# Patient Record
Sex: MALE | Race: WHITE | Employment: OTHER | URBAN - METROPOLITAN AREA
[De-identification: names, ages, dates, MRNs, and addresses within clinical notes are randomized per-mention and may not be internally consistent; named-entity substitution may affect disease eponyms.]

---

## 2017-01-06 ENCOUNTER — HOSPITAL ENCOUNTER (OUTPATIENT)
Dept: CARDIOLOGY | Facility: CLINIC | Age: 52
Discharge: HOME | End: 2017-01-06
Attending: INTERNAL MEDICINE | Admitting: INTERNAL MEDICINE

## 2017-01-06 LAB
LV EF: 58 %
LVEF MODALITY: NORMAL

## 2017-01-09 ENCOUNTER — TELEPHONE (OUTPATIENT)
Dept: CARDIOLOGY CLINIC | Age: 52
End: 2017-01-09

## 2017-03-15 ENCOUNTER — OFFICE VISIT (OUTPATIENT)
Dept: OTHER | Age: 52
End: 2017-03-15

## 2017-03-15 ENCOUNTER — HOSPITAL ENCOUNTER (OUTPATIENT)
Dept: OTHER | Age: 52
Discharge: HOME | End: 2017-03-15
Attending: INTERNAL MEDICINE | Admitting: INTERNAL MEDICINE

## 2017-03-15 VITALS
SYSTOLIC BLOOD PRESSURE: 132 MMHG | HEIGHT: 71 IN | DIASTOLIC BLOOD PRESSURE: 76 MMHG | BODY MASS INDEX: 38.36 KG/M2 | WEIGHT: 274 LBS

## 2017-03-15 DIAGNOSIS — Z13.1 DM (DIABETES MELLITUS SCREEN): Primary | ICD-10-CM

## 2017-03-15 PROCEDURE — 99999 PR OFFICE/OUTPT VISIT,PROCEDURE ONLY: CPT | Performed by: HOSPITALIST

## 2017-03-15 RX ORDER — LISINOPRIL 5 MG/1
5 TABLET ORAL DAILY
Qty: 90 TABLET | Refills: 1 | Status: SHIPPED | OUTPATIENT
Start: 2017-03-15 | End: 2018-01-30 | Stop reason: SDUPTHER

## 2017-03-15 RX ORDER — NYSTATIN 100000 [USP'U]/G
POWDER TOPICAL
Qty: 1 BOTTLE | Refills: 5 | Status: SHIPPED | OUTPATIENT
Start: 2017-03-15 | End: 2017-06-14 | Stop reason: ALTCHOICE

## 2017-03-15 RX ORDER — CARVEDILOL 6.25 MG/1
TABLET ORAL
Qty: 180 TABLET | Refills: 1 | Status: SHIPPED | OUTPATIENT
Start: 2017-03-15 | End: 2018-01-30 | Stop reason: SDUPTHER

## 2017-03-15 RX ORDER — KETOCONAZOLE 20 MG/ML
SHAMPOO TOPICAL
Qty: 1 BOTTLE | Refills: 3 | Status: SHIPPED | OUTPATIENT
Start: 2017-03-15 | End: 2018-01-30 | Stop reason: ALTCHOICE

## 2017-06-14 ENCOUNTER — OFFICE VISIT (OUTPATIENT)
Dept: OTHER | Age: 52
End: 2017-06-14

## 2017-06-14 ENCOUNTER — HOSPITAL ENCOUNTER (OUTPATIENT)
Dept: OTHER | Age: 52
Discharge: HOME | End: 2017-06-14
Attending: INTERNAL MEDICINE | Admitting: INTERNAL MEDICINE

## 2017-06-14 VITALS
BODY MASS INDEX: 37.8 KG/M2 | SYSTOLIC BLOOD PRESSURE: 120 MMHG | HEIGHT: 71 IN | DIASTOLIC BLOOD PRESSURE: 80 MMHG | WEIGHT: 270 LBS

## 2017-06-14 DIAGNOSIS — E11.9 DIABETES MELLITUS WITHOUT COMPLICATION (HCC): Primary | ICD-10-CM

## 2017-06-14 PROCEDURE — 99999 PR OFFICE/OUTPT VISIT,PROCEDURE ONLY: CPT | Performed by: PODIATRIST

## 2018-01-18 RX ORDER — SIMVASTATIN 20 MG
TABLET ORAL
Qty: 90 TABLET | Refills: 0 | Status: SHIPPED | OUTPATIENT
Start: 2018-01-18 | End: 2018-01-30

## 2018-01-30 ENCOUNTER — HOSPITAL ENCOUNTER (OUTPATIENT)
Dept: OTHER | Age: 53
Discharge: HOME | End: 2018-01-30
Attending: INTERNAL MEDICINE | Admitting: INTERNAL MEDICINE

## 2018-01-30 ENCOUNTER — OFFICE VISIT (OUTPATIENT)
Dept: OTHER | Age: 53
End: 2018-01-30

## 2018-01-30 VITALS
OXYGEN SATURATION: 97 % | DIASTOLIC BLOOD PRESSURE: 80 MMHG | SYSTOLIC BLOOD PRESSURE: 124 MMHG | HEART RATE: 77 BPM | BODY MASS INDEX: 39.06 KG/M2 | WEIGHT: 279 LBS | HEIGHT: 71 IN

## 2018-01-30 DIAGNOSIS — I10 ESSENTIAL HYPERTENSION: ICD-10-CM

## 2018-01-30 DIAGNOSIS — E11.42 TYPE 2 DIABETES MELLITUS WITH DIABETIC POLYNEUROPATHY, WITHOUT LONG-TERM CURRENT USE OF INSULIN (HCC): Primary | ICD-10-CM

## 2018-01-30 DIAGNOSIS — R06.09 DYSPNEA ON EXERTION: ICD-10-CM

## 2018-01-30 DIAGNOSIS — I25.10 CORONARY ARTERY DISEASE INVOLVING NATIVE CORONARY ARTERY OF NATIVE HEART WITHOUT ANGINA PECTORIS: ICD-10-CM

## 2018-01-30 LAB — HBA1C MFR BLD: 8.6 %

## 2018-01-30 PROCEDURE — 99999 PR OFFICE/OUTPT VISIT,PROCEDURE ONLY: CPT | Performed by: INTERNAL MEDICINE

## 2018-01-30 PROCEDURE — 83036 HEMOGLOBIN GLYCOSYLATED A1C: CPT | Performed by: INTERNAL MEDICINE

## 2018-01-30 RX ORDER — LISINOPRIL 5 MG/1
5 TABLET ORAL DAILY
Qty: 30 TABLET | Refills: 5 | Status: SHIPPED | OUTPATIENT
Start: 2018-01-30 | End: 2018-08-27 | Stop reason: SDUPTHER

## 2018-01-30 RX ORDER — CARVEDILOL 6.25 MG/1
TABLET ORAL
Qty: 60 TABLET | Refills: 5 | Status: SHIPPED | OUTPATIENT
Start: 2018-01-30 | End: 2018-08-27 | Stop reason: SDUPTHER

## 2018-01-30 RX ORDER — ATORVASTATIN CALCIUM 20 MG/1
20 TABLET, FILM COATED ORAL DAILY
Qty: 30 TABLET | Refills: 5 | Status: SHIPPED | OUTPATIENT
Start: 2018-01-30 | End: 2018-08-27 | Stop reason: SDUPTHER

## 2018-02-01 RX ORDER — GABAPENTIN 100 MG/1
100 CAPSULE ORAL 2 TIMES DAILY
Qty: 60 CAPSULE | Refills: 5 | Status: SHIPPED | OUTPATIENT
Start: 2018-02-01 | End: 2019-11-27 | Stop reason: ALTCHOICE

## 2018-02-22 ENCOUNTER — OFFICE VISIT (OUTPATIENT)
Dept: CARDIOLOGY CLINIC | Age: 53
End: 2018-02-22

## 2018-02-22 ENCOUNTER — OFFICE VISIT (OUTPATIENT)
Dept: OTHER | Age: 53
End: 2018-02-22

## 2018-02-22 ENCOUNTER — HOSPITAL ENCOUNTER (OUTPATIENT)
Dept: OTHER | Age: 53
Discharge: HOME | End: 2018-02-22
Attending: INTERNAL MEDICINE | Admitting: INTERNAL MEDICINE

## 2018-02-22 VITALS
HEART RATE: 81 BPM | DIASTOLIC BLOOD PRESSURE: 80 MMHG | OXYGEN SATURATION: 96 % | SYSTOLIC BLOOD PRESSURE: 126 MMHG | HEIGHT: 71 IN | WEIGHT: 272 LBS | BODY MASS INDEX: 38.08 KG/M2

## 2018-02-22 VITALS
DIASTOLIC BLOOD PRESSURE: 70 MMHG | BODY MASS INDEX: 39.06 KG/M2 | HEART RATE: 74 BPM | SYSTOLIC BLOOD PRESSURE: 120 MMHG | WEIGHT: 279 LBS | HEIGHT: 71 IN | OXYGEN SATURATION: 97 %

## 2018-02-22 DIAGNOSIS — I25.10 CORONARY ARTERY DISEASE INVOLVING NATIVE CORONARY ARTERY OF NATIVE HEART WITHOUT ANGINA PECTORIS: ICD-10-CM

## 2018-02-22 DIAGNOSIS — R07.89 OTHER CHEST PAIN: ICD-10-CM

## 2018-02-22 DIAGNOSIS — I10 ESSENTIAL HYPERTENSION: ICD-10-CM

## 2018-02-22 DIAGNOSIS — E11.42 TYPE 2 DIABETES MELLITUS WITH DIABETIC POLYNEUROPATHY, WITHOUT LONG-TERM CURRENT USE OF INSULIN (HCC): Primary | ICD-10-CM

## 2018-02-22 DIAGNOSIS — R06.02 SOB (SHORTNESS OF BREATH): Primary | ICD-10-CM

## 2018-02-22 DIAGNOSIS — E78.00 HYPERCHOLESTEREMIA: ICD-10-CM

## 2018-02-22 DIAGNOSIS — R55 SYNCOPE, UNSPECIFIED SYNCOPE TYPE: ICD-10-CM

## 2018-02-22 DIAGNOSIS — E78.2 MIXED HYPERLIPIDEMIA: ICD-10-CM

## 2018-02-22 PROCEDURE — 99213 OFFICE O/P EST LOW 20 MIN: CPT | Performed by: INTERNAL MEDICINE

## 2018-02-22 PROCEDURE — 99999 PR OFFICE/OUTPT VISIT,PROCEDURE ONLY: CPT | Performed by: INTERNAL MEDICINE

## 2018-02-22 RX ORDER — NITROGLYCERIN 0.4 MG/1
0.4 TABLET SUBLINGUAL EVERY 5 MIN PRN
Qty: 25 TABLET | Refills: 3 | Status: SHIPPED | OUTPATIENT
Start: 2018-02-22 | End: 2019-05-10 | Stop reason: SDUPTHER

## 2018-02-22 RX ORDER — GLYBURIDE 5 MG/1
5 TABLET ORAL
Qty: 30 TABLET | Refills: 3 | Status: SHIPPED | OUTPATIENT
Start: 2018-02-22 | End: 2018-06-27 | Stop reason: SDUPTHER

## 2018-02-22 NOTE — LETTER
[address] [facility] - [address] [place] [address] Ale Cuellar [id number]  Phone: [phone]  Fax: [phone]    Sumit Modi MD        February 22, 2018     Elin Chester MD  [address] [facility] [address]    Patient: Ricardo Rivero  MR Number: [id number]  YOB: 1965  Date of Visit: 2/22/2018    Dear Dr. Elin Chester:    Thank you for allowing me to participate in the care of Ricardo Rivero. Below are the relevant portions of my assessment and plan of care. Assessment and Plan   Ricardo Rivero is a 47 y.o. male who presents today for the following problems:       1. CAD:  cath with nonobstructive CAD. 50% ostial LCx. No angina              -felt that  Hypertensive response to exercise as cause of CP              ~ Cont ASA, zocor, lisinopril, coreg     2. 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 Syncope- sounds vasovagal in nature. - No further events     4. HLD- pt off meds, stress importance     5. HTN- controlled.     6. Epistaxis: still number of episodes on ASA 81mg,               - ENT note, pt with superficial vessel and just needs to humidfy nostrils. stable     7. SOB: likely Anxiety attacks and rise in BP              - echo and stress test normal (except for HTN response)     Plan:  1 stress reduction techniques, trial for NTG for symptoms              - follow home BP  2. Lipids in 3-6mon with PCP  Follow up in 1 year        If you have questions, please do not hesitate to call me. I look forward to following Barbara Brewer along with you.     Sincerely,        Sumit Modi MD

## 2018-02-22 NOTE — PROGRESS NOTES
(diabetes mellitus) ([facility] [address] Hyperlipidemia; and Hypertension. Surgical History:   has a past surgical history that includes Coronary angioplasty (10/20/14) and cardiovascular stress test (11/2013). Social History:   reports that he has never smoked. He has never used smokeless tobacco. He reports that he drinks alcohol. He reports that he does not use drugs. Family History:  No evidence for sudden cardiac death or premature CAD    Home Medications:  Reviewed and are listed in nursing record. and/or listed below  Current Outpatient Prescriptions   Medication Sig Dispense Refill    gabapentin (NEURONTIN) 100 MG capsule Take 1 capsule by mouth 2 times daily for 360 doses. 60 capsule 5    atorvastatin (LIPITOR) 20 MG tablet Take 1 tablet by mouth daily 30 tablet 5    metFORMIN (GLUCOPHAGE) 1000 MG tablet Take 1 tablet by mouth 2 times daily (with meals) 60 tablet 5    carvedilol (COREG) 6.25 MG tablet TAKE ONE TABLET BY MOUTH TWICE A DAY WITH MEALS 60 tablet 5    lisinopril (PRINIVIL;ZESTRIL) 5 MG tablet Take 1 tablet by mouth daily 30 tablet 5    aspirin 81 MG chewable tablet Take 1 tablet by mouth daily. 30 tablet 0    glyBURIDE (DIABETA) 5 MG tablet Take 1 tablet by mouth daily (with breakfast) 30 tablet 3    nitroGLYCERIN (NITROSTAT) 0.4 MG SL tablet Place 1 tablet under the tongue every 5 minutes as needed for Chest pain. 25 tablet 3     No current facility-administered medications for this visit. Allergies:  Review of patient's allergies indicates no known allergies. Review of Systems:   All 12 point review of symptoms completed. Pertinent positives identified in the HPI, all other review of symptoms negative as below.     Objective:   PHYSICAL EXAM:    Vitals:    02/22/18 1158   BP: 126/80   Pulse: 81   SpO2: 96%    Weight: 272 lb (123.4 kg)     Wt Readings from Last 3 Encounters:   02/22/18 272 lb (123.4 kg)   02/22/18 279 lb (126.6 kg)   01/30/18 279 lb (126.6 kg)         General

## 2018-02-22 NOTE — PROGRESS NOTES
lymphadenopathy,   LUNGS:  Clear and without wheezes  HEART:  Regular rhythm, no appreciable murmur  ABD:  Benign, active bowel sounds  EXT:  No edema, pulses palpable  NEURO:  No focal neurologic deficits noted. ASSESSMENT / PLAN:   1. Type 2 Diabetes: The hemoglobin A1c was increased to 8.6% last visit (this was last checked December 2016 at 8.1, goal less than 7.0). Continue taking metformin 1000 mg twice per day. Start taking glyburide 5 mg ONCE per day in the morning. 2. Hypertension:  Blood pressure excellent today at 120/70, goal less than 130/90. Continue taking lisinopril 5 mg once per day and carvedilol (Coreg) 6.25 mg twice per day. 3. History of heart disease:  Continue taking aspirin 81 mg daily. 4. Cholesterol:  Continue taking atorvastatin (Lipitor) 20 mg every evening.      Follow-up with cardiology and clinic three months

## 2018-05-21 ENCOUNTER — OFFICE VISIT (OUTPATIENT)
Dept: OTHER | Age: 53
End: 2018-05-21

## 2018-05-21 ENCOUNTER — HOSPITAL ENCOUNTER (OUTPATIENT)
Dept: GENERAL RADIOLOGY | Age: 53
Discharge: HOME | End: 2018-05-21
Attending: INTERNAL MEDICINE | Admitting: INTERNAL MEDICINE

## 2018-05-21 ENCOUNTER — HOSPITAL ENCOUNTER (OUTPATIENT)
Dept: OTHER | Age: 53
Discharge: HOME | End: 2018-05-21
Attending: INTERNAL MEDICINE | Admitting: INTERNAL MEDICINE

## 2018-05-21 VITALS
OXYGEN SATURATION: 95 % | DIASTOLIC BLOOD PRESSURE: 78 MMHG | HEIGHT: 72 IN | HEART RATE: 73 BPM | SYSTOLIC BLOOD PRESSURE: 138 MMHG | BODY MASS INDEX: 37.38 KG/M2 | WEIGHT: 276 LBS

## 2018-05-21 DIAGNOSIS — R53.83 FATIGUE, UNSPECIFIED TYPE: ICD-10-CM

## 2018-05-21 DIAGNOSIS — E78.00 HYPERCHOLESTEREMIA: ICD-10-CM

## 2018-05-21 DIAGNOSIS — I10 ESSENTIAL HYPERTENSION: ICD-10-CM

## 2018-05-21 DIAGNOSIS — I25.10 CORONARY ARTERY DISEASE INVOLVING NATIVE CORONARY ARTERY OF NATIVE HEART WITHOUT ANGINA PECTORIS: ICD-10-CM

## 2018-05-21 LAB
A/G RATIO: 1.5 (ref 1.1–2.2)
ALBUMIN SERPL-MCNC: 4.5 G/DL (ref 3.4–5)
ALP BLD-CCNC: 53 U/L (ref 40–129)
ALT SERPL-CCNC: 33 U/L (ref 10–40)
ANION GAP SERPL CALCULATED.3IONS-SCNC: 18 MMOL/L (ref 3–16)
AST SERPL-CCNC: 22 U/L (ref 15–37)
BASOPHILS ABSOLUTE: 0.1 K/UL (ref 0–0.2)
BASOPHILS RELATIVE PERCENT: 1 %
BILIRUB SERPL-MCNC: 0.5 MG/DL (ref 0–1)
BUN BLDV-MCNC: 12 MG/DL (ref 7–20)
CALCIUM SERPL-MCNC: 9.6 MG/DL (ref 8.3–10.6)
CHLORIDE BLD-SCNC: 101 MMOL/L (ref 99–110)
CO2: 24 MMOL/L (ref 21–32)
CREAT SERPL-MCNC: 0.7 MG/DL (ref 0.9–1.3)
EOSINOPHILS ABSOLUTE: 0.3 K/UL (ref 0–0.6)
EOSINOPHILS RELATIVE PERCENT: 3.1 %
GFR AFRICAN AMERICAN: >60
GFR NON-AFRICAN AMERICAN: >60
GLOBULIN: 3 G/DL
GLUCOSE BLD-MCNC: 147 MG/DL (ref 70–99)
HCT VFR BLD CALC: 46.9 % (ref 40.5–52.5)
HEMOGLOBIN: 16.4 G/DL (ref 13.5–17.5)
LYMPHOCYTES ABSOLUTE: 2.4 K/UL (ref 1–5.1)
LYMPHOCYTES RELATIVE PERCENT: 28 %
MCH RBC QN AUTO: 28.8 PG (ref 26–34)
MCHC RBC AUTO-ENTMCNC: 34.9 G/DL (ref 31–36)
MCV RBC AUTO: 82.5 FL (ref 80–100)
MONOCYTES ABSOLUTE: 0.8 K/UL (ref 0–1.3)
MONOCYTES RELATIVE PERCENT: 9.2 %
NEUTROPHILS ABSOLUTE: 5 K/UL (ref 1.7–7.7)
NEUTROPHILS RELATIVE PERCENT: 58.7 %
PDW BLD-RTO: 14.3 % (ref 12.4–15.4)
PLATELET # BLD: 209 K/UL (ref 135–450)
PMV BLD AUTO: 9.5 FL (ref 5–10.5)
POTASSIUM SERPL-SCNC: 4.4 MMOL/L (ref 3.5–5.1)
RBC # BLD: 5.68 M/UL (ref 4.2–5.9)
SODIUM BLD-SCNC: 143 MMOL/L (ref 136–145)
TOTAL PROTEIN: 7.5 G/DL (ref 6.4–8.2)
TSH REFLEX FT4: 1.62 UIU/ML (ref 0.27–4.2)
VITAMIN B-12: 600 PG/ML (ref 211–911)
WBC # BLD: 8.4 K/UL (ref 4–11)

## 2018-05-21 PROCEDURE — 83036 HEMOGLOBIN GLYCOSYLATED A1C: CPT | Performed by: INTERNAL MEDICINE

## 2018-05-21 PROCEDURE — 99999 PR OFFICE/OUTPT VISIT,PROCEDURE ONLY: CPT | Performed by: INTERNAL MEDICINE

## 2018-05-21 RX ORDER — CITALOPRAM 20 MG/1
20 TABLET ORAL DAILY
Qty: 30 TABLET | Refills: 3 | Status: SHIPPED | OUTPATIENT
Start: 2018-05-21 | End: 2018-08-27 | Stop reason: SINTOL

## 2018-05-22 ENCOUNTER — TELEPHONE (OUTPATIENT)
Dept: OTHER | Age: 53
End: 2018-05-22

## 2018-05-23 LAB — HBA1C MFR BLD: 7 %

## 2018-06-28 RX ORDER — GLYBURIDE 5 MG/1
TABLET ORAL
Qty: 30 TABLET | Refills: 0 | Status: SHIPPED | OUTPATIENT
Start: 2018-06-28 | End: 2018-08-06 | Stop reason: SDUPTHER

## 2018-08-06 RX ORDER — GLYBURIDE 5 MG/1
TABLET ORAL
Qty: 30 TABLET | Refills: 3 | Status: SHIPPED | OUTPATIENT
Start: 2018-08-06 | End: 2019-01-15 | Stop reason: SDUPTHER

## 2018-08-27 ENCOUNTER — OFFICE VISIT (OUTPATIENT)
Dept: OTHER | Age: 53
End: 2018-08-27

## 2018-08-27 VITALS
BODY MASS INDEX: 37.38 KG/M2 | DIASTOLIC BLOOD PRESSURE: 81 MMHG | HEART RATE: 72 BPM | HEIGHT: 72 IN | SYSTOLIC BLOOD PRESSURE: 120 MMHG | WEIGHT: 276 LBS | OXYGEN SATURATION: 96 %

## 2018-08-27 DIAGNOSIS — G47.33 OBSTRUCTIVE SLEEP APNEA SYNDROME: ICD-10-CM

## 2018-08-27 DIAGNOSIS — I10 ESSENTIAL HYPERTENSION: ICD-10-CM

## 2018-08-27 LAB — HBA1C MFR BLD: 6.7 %

## 2018-08-27 PROCEDURE — 83036 HEMOGLOBIN GLYCOSYLATED A1C: CPT | Performed by: INTERNAL MEDICINE

## 2018-08-27 PROCEDURE — 99214 OFFICE O/P EST MOD 30 MIN: CPT | Performed by: INTERNAL MEDICINE

## 2018-08-27 RX ORDER — LISINOPRIL 5 MG/1
5 TABLET ORAL DAILY
Qty: 30 TABLET | Refills: 5 | Status: SHIPPED | OUTPATIENT
Start: 2018-08-27 | End: 2019-05-06 | Stop reason: SDUPTHER

## 2018-08-27 RX ORDER — CARVEDILOL 6.25 MG/1
TABLET ORAL
Qty: 60 TABLET | Refills: 5 | Status: SHIPPED | OUTPATIENT
Start: 2018-08-27 | End: 2019-05-06 | Stop reason: SDUPTHER

## 2018-08-27 RX ORDER — ATORVASTATIN CALCIUM 20 MG/1
20 TABLET, FILM COATED ORAL DAILY
Qty: 30 TABLET | Refills: 5 | Status: SHIPPED | OUTPATIENT
Start: 2018-08-27 | End: 2019-05-06 | Stop reason: SDUPTHER

## 2018-08-27 NOTE — PROGRESS NOTES
thickness and systolic function with an estimated ejection fraction of 55-60%. No regional wall motion abnormalities are seen.  Grade I diastolic dysfunction with normal filing pressure. Cath (Oct 2014) nonobstructive CAD. 50% ostial LCx       OBJECTIVE:    /81   Pulse 72   Ht 6' (1.829 m)   Wt 276 lb (125.2 kg)   SpO2 96%   BMI 37.43 kg/m²   HEENT:  Oropharynx clear, no lymphadenopathy,   LUNGS:  Clear and without wheezes  HEART:  Regular rhythm, no appreciable murmur  ABD:  Benign, active bowel sounds  EXT:  No edema, pulses palpable  NEURO:  No focal neurologic deficits noted. ASSESSMENT / PLAN:   1. Type 2 Diabetes:  The hemoglobin A1c is good today at 6.7% (goal less than 7.0). Continue taking metformin 1000 mg twice per day AND glyburide 5 mg ONCE per day in the morning.    2. Hypertension:  Blood pressure is reasonable today at 120/81, goal less than 130/90.  Continue taking lisinopril 5 mg once per day and carvedilol (Coreg) 6.25 mg twice per day.    3. History of heart disease:  Continue taking aspirin 81 mg daily. 4. Cholesterol:  Continue taking atorvastatin (Lipitor) 20 mg every evening. 5. Anxiety from serotonin depletion:  Lab tests for blood count, B12, TSH (thyroid level), and electrolytes are normal.    6. Daytime post-prandial (after you eat) somnolence (sleepiness): We will refer for sleep evaluation to pulmonary. Check glucose level to make sure that you are not hypoglycemic.   Carry some hard candy with you for potential low blood sugar levels.       Follow-up in four weeks  Check morning (fasting) blood glucose most days, write down and bring to clinic

## 2018-08-27 NOTE — PATIENT INSTRUCTIONS
1. Type 2 Diabetes:  The hemoglobin A1c is good today at 6.7% (goal less than 7.0). Continue taking metformin 1000 mg twice per day AND glyburide 5 mg ONCE per day in the morning.    2. Hypertension:  Blood pressure is reasonable today at 120/81, goal less than 130/90.  Continue taking lisinopril 5 mg once per day and carvedilol (Coreg) 6.25 mg twice per day.    3. History of heart disease:  Continue taking aspirin 81 mg daily. 4. Cholesterol:  Continue taking atorvastatin (Lipitor) 20 mg every evening. 5. Anxiety from serotonin depletion:  Lab tests for blood count, B12, TSH (thyroid level), and electrolytes are normal.    6. Daytime post-prandial (after you eat) somnolence (sleepiness): We will refer for sleep evaluation to pulmonary. Check glucose level to make sure that you are not hypoglycemic.   Carry some hard candy with you for potential low blood sugar levels.       Follow-up in four weeks  Check morning (fasting) blood glucose most days, write down and bring to clinic

## 2018-09-24 ENCOUNTER — OFFICE VISIT (OUTPATIENT)
Dept: INTERNAL MEDICINE CLINIC | Age: 53
End: 2018-09-24

## 2018-09-24 VITALS
BODY MASS INDEX: 37.38 KG/M2 | HEART RATE: 72 BPM | DIASTOLIC BLOOD PRESSURE: 74 MMHG | OXYGEN SATURATION: 97 % | WEIGHT: 276 LBS | SYSTOLIC BLOOD PRESSURE: 120 MMHG | HEIGHT: 72 IN

## 2018-09-24 DIAGNOSIS — I10 ESSENTIAL HYPERTENSION: ICD-10-CM

## 2018-09-24 DIAGNOSIS — E78.00 HYPERCHOLESTEREMIA: ICD-10-CM

## 2018-09-24 DIAGNOSIS — G47.33 OBSTRUCTIVE SLEEP APNEA SYNDROME: ICD-10-CM

## 2018-09-24 NOTE — PROGRESS NOTES
SUBJECTIVE:   One month follow up from 8/27 for diabetes, morning glucose levels 170-180. Sleep is reasonable but up to urinate 1-2 times per night. Body aches at night. Follow up from 5/21 for anxious depression in addition to diabetes and hypertension. He stopped taking Celexa because it made him feel like he had a hangover. Sleep was unchanged. He sleeps about 4-6 hours per night. Morning glucose levels not checked. He notes sudden daytime somnolence that is triggered by eating.       History of Present Illness:   History of diabetes and hypertension. Taking metformin 1000 mg twice daily and now on glyburide 5 mg in the morning.  Morning glucose mostly between 170-220.  No hypoglycemic episodes.  He is able to sleep without orthopnea, some dyspnea with exertion.  Only sleeping approx 5 hours nightly. He now goes to sleep quickly around 10 pm with some early awakening.  Usually wakes up at 5:30 am.  Some anxiety during the day with palpitations and frustration.  Diabetic neuropathy is reasonable and taking gabapentin only as needed. MEDICATIONS:  atorvastatin (LIPITOR) 20 MG tablet Take 1 tablet by mouth daily   metFORMIN (GLUCOPHAGE) 1000 MG tablet Take 1 tablet by mouth 2 times daily (with meals)   carvedilol (COREG) 6.25 MG tablet TAKE ONE TABLET BY MOUTH TWICE A DAY WITH MEALS   lisinopril (PRINIVIL;ZESTRIL) 5 MG tablet Take 1 tablet by mouth daily   glyBURIDE (DIABETA) 5 MG tablet TAKE ONE TABLET BY MOUTH DAILY WITH BREAKFAST   nitroGLYCERIN (NITROSTAT) 0.4 MG SL tablet Place 1 tablet under the tongue every 5 minutes as needed for Chest pain   aspirin 81 MG chewable tablet Take 1 tablet by mouth daily. Hemoglobin A1c (1/30/18) 8.6%, (5/21/18) 7.0%, (8/27/18) 6.7%     Echocardiogram:  (1/6/17) Normal left ventricle size, wall thickness and systolic function with an estimated ejection fraction of 55-60%.  No regional wall motion abnormalities are seen.  Grade I diastolic dysfunction with normal filing pressure.     Cath (Oct 2014) nonobstructive CAD. 50% ostial LCx       OBJECTIVE:    /74   Pulse 72   Ht 6' (1.829 m)   Wt 276 lb (125.2 kg)   SpO2 97%   BMI 37.43 kg/m²   HEENT:  Oropharynx clear, no lymphadenopathy,   LUNGS:  Clear and without wheezes  HEART:  Regular rhythm, no appreciable murmur  ABD:  Benign, active bowel sounds  EXT:  No edema, pulses palpable  NEURO:  No focal neurologic deficits noted. ASSESSMENT / PLAN:   1. Type 2 Diabetes:  The hemoglobin A1c is last month is good at 6.7% (goal less than 7.0).  Continue taking metformin 1000 mg twice per day AND glyburide 5 mg ONCE per day in the morning.    2. Hypertension:  Blood pressure is reasonable today at 120/74, goal less than 130/90.  Continue taking lisinopril 5 mg once per day and carvedilol (Coreg) 6.25 mg twice per day.    3. History of heart disease:  Continue taking aspirin 81 mg daily. 4. Cholesterol:  Continue taking atorvastatin (Lipitor) 20 mg every evening. 5. Anxiety from serotonin depletion:  Lab tests for blood count, B12, TSH (thyroid level), and electrolytes are normal.    6. Daytime post-prandial (after you eat) somnolence (sleepiness):  Sleep evaluation by pulmonary pending. Check glucose level to make sure that you are not hypoglycemic. Carry some hard candy with you for potential low blood sugar levels. 7. Osteoarthritis:  You can take Aleve (naproxen) 220 mg 1-2 times per day. Take an over the counter famotidine (Pepcid) 20 mg tablet when you Aleve.       Follow-up 3 months

## 2018-09-24 NOTE — PATIENT INSTRUCTIONS
1. Type 2 Diabetes:  The hemoglobin A1c is last month is good at 6.7% (goal less than 7.0).  Continue taking metformin 1000 mg twice per day AND glyburide 5 mg ONCE per day in the morning.    2. Hypertension:  Blood pressure is reasonable today at 120/74, goal less than 130/90.  Continue taking lisinopril 5 mg once per day and carvedilol (Coreg) 6.25 mg twice per day.    3. History of heart disease:  Continue taking aspirin 81 mg daily. 4. Cholesterol:  Continue taking atorvastatin (Lipitor) 20 mg every evening. 5. Anxiety from serotonin depletion:  Lab tests for blood count, B12, TSH (thyroid level), and electrolytes are normal.    6. Daytime post-prandial (after you eat) somnolence (sleepiness):  Sleep evaluation by pulmonary pending. Check glucose level to make sure that you are not hypoglycemic. Carry some hard candy with you for potential low blood sugar levels. 7. Osteoarthritis:  You can take Aleve (naproxen) 220 mg 1-2 times per day. Take an over the counter famotidine (Pepcid) 20 mg tablet when you Aleve.       Follow-up 3 months

## 2018-10-26 ENCOUNTER — TELEPHONE (OUTPATIENT)
Dept: PULMONOLOGY | Age: 53
End: 2018-10-26

## 2019-01-17 RX ORDER — GLYBURIDE 5 MG/1
TABLET ORAL
Qty: 30 TABLET | Refills: 2 | Status: SHIPPED | OUTPATIENT
Start: 2019-01-17 | End: 2019-05-06 | Stop reason: SDUPTHER

## 2019-05-06 RX ORDER — CARVEDILOL 6.25 MG/1
TABLET ORAL
Qty: 180 TABLET | Refills: 1 | OUTPATIENT
Start: 2019-05-06 | End: 2019-11-20 | Stop reason: SDUPTHER

## 2019-05-06 RX ORDER — GLYBURIDE 5 MG/1
TABLET ORAL
Qty: 90 TABLET | Refills: 1 | OUTPATIENT
Start: 2019-05-06 | End: 2019-11-20 | Stop reason: SDUPTHER

## 2019-05-06 RX ORDER — LISINOPRIL 5 MG/1
5 TABLET ORAL DAILY
Qty: 90 TABLET | Refills: 1 | OUTPATIENT
Start: 2019-05-06 | End: 2019-11-20 | Stop reason: SDUPTHER

## 2019-05-06 RX ORDER — ATORVASTATIN CALCIUM 20 MG/1
20 TABLET, FILM COATED ORAL DAILY
Qty: 90 TABLET | Refills: 1 | OUTPATIENT
Start: 2019-05-06 | End: 2019-11-20 | Stop reason: SDUPTHER

## 2019-05-10 DIAGNOSIS — R07.89 OTHER CHEST PAIN: ICD-10-CM

## 2019-05-13 RX ORDER — NITROGLYCERIN 0.4 MG/1
TABLET SUBLINGUAL
Qty: 25 TABLET | Refills: 0 | Status: SHIPPED | OUTPATIENT
Start: 2019-05-13 | End: 2019-08-18 | Stop reason: SDUPTHER

## 2019-05-13 NOTE — TELEPHONE ENCOUNTER
SHADI Pt  Last office visit 2/22/18  Plan:  1 stress reduction techniques, trial for NTG for symptoms              - follow home BP  2. Lipids in 3-6mon with PCP  Follow up in 1 year     I called and LMOM for pt to contact the office for a yearly appt.

## 2019-08-18 DIAGNOSIS — R07.89 OTHER CHEST PAIN: ICD-10-CM

## 2019-08-19 ENCOUNTER — TELEPHONE (OUTPATIENT)
Dept: CARDIOLOGY CLINIC | Age: 54
End: 2019-08-19

## 2019-08-19 RX ORDER — NITROGLYCERIN 0.4 MG/1
TABLET SUBLINGUAL
Qty: 25 TABLET | Refills: 0 | Status: SHIPPED | OUTPATIENT
Start: 2019-08-19 | End: 2020-03-11 | Stop reason: SDUPTHER

## 2019-11-21 RX ORDER — ATORVASTATIN CALCIUM 20 MG/1
TABLET, FILM COATED ORAL
Qty: 90 TABLET | Refills: 1 | Status: SHIPPED | OUTPATIENT
Start: 2019-11-21 | End: 2020-06-11 | Stop reason: SDUPTHER

## 2019-11-21 RX ORDER — GLYBURIDE 5 MG/1
TABLET ORAL
Qty: 90 TABLET | Refills: 1 | Status: SHIPPED | OUTPATIENT
Start: 2019-11-21 | End: 2020-06-11 | Stop reason: SDUPTHER

## 2019-11-21 RX ORDER — LISINOPRIL 5 MG/1
TABLET ORAL
Qty: 90 TABLET | Refills: 1 | Status: SHIPPED | OUTPATIENT
Start: 2019-11-21 | End: 2020-06-11 | Stop reason: SDUPTHER

## 2019-11-21 RX ORDER — CARVEDILOL 6.25 MG/1
TABLET ORAL
Qty: 180 TABLET | Refills: 1 | Status: SHIPPED | OUTPATIENT
Start: 2019-11-21 | End: 2020-06-11 | Stop reason: SDUPTHER

## 2019-11-27 ENCOUNTER — OFFICE VISIT (OUTPATIENT)
Dept: INTERNAL MEDICINE CLINIC | Age: 54
End: 2019-11-27

## 2019-11-27 VITALS
HEART RATE: 80 BPM | BODY MASS INDEX: 36.84 KG/M2 | HEIGHT: 72 IN | OXYGEN SATURATION: 97 % | DIASTOLIC BLOOD PRESSURE: 76 MMHG | SYSTOLIC BLOOD PRESSURE: 124 MMHG | WEIGHT: 272 LBS

## 2019-11-27 DIAGNOSIS — L82.1 SEBORRHEIC KERATOSES: ICD-10-CM

## 2019-11-27 DIAGNOSIS — J30.1 NON-SEASONAL ALLERGIC RHINITIS DUE TO POLLEN: ICD-10-CM

## 2019-11-27 DIAGNOSIS — I25.10 CORONARY ARTERY DISEASE DUE TO LIPID RICH PLAQUE: ICD-10-CM

## 2019-11-27 DIAGNOSIS — I25.83 CORONARY ARTERY DISEASE DUE TO LIPID RICH PLAQUE: ICD-10-CM

## 2019-11-27 DIAGNOSIS — F51.01 PRIMARY INSOMNIA: ICD-10-CM

## 2019-11-27 DIAGNOSIS — D18.01 HEMANGIOMA OF SKIN: ICD-10-CM

## 2019-11-27 DIAGNOSIS — I10 ESSENTIAL HYPERTENSION: ICD-10-CM

## 2019-11-27 LAB — HBA1C MFR BLD: 6.8 %

## 2019-11-27 PROCEDURE — 83036 HEMOGLOBIN GLYCOSYLATED A1C: CPT | Performed by: INTERNAL MEDICINE

## 2019-11-27 PROCEDURE — 99214 OFFICE O/P EST MOD 30 MIN: CPT | Performed by: INTERNAL MEDICINE

## 2019-11-27 PROCEDURE — 90471 IMMUNIZATION ADMIN: CPT | Performed by: INTERNAL MEDICINE

## 2019-11-27 PROCEDURE — 90686 IIV4 VACC NO PRSV 0.5 ML IM: CPT | Performed by: INTERNAL MEDICINE

## 2019-11-27 RX ORDER — GABAPENTIN 100 MG/1
100 CAPSULE ORAL 2 TIMES DAILY PRN
Qty: 60 CAPSULE | Refills: 2 | Status: SHIPPED | OUTPATIENT
Start: 2019-11-27 | End: 2022-06-23 | Stop reason: ALTCHOICE

## 2019-11-27 RX ORDER — GABAPENTIN 100 MG/1
100 CAPSULE ORAL 2 TIMES DAILY
Qty: 60 CAPSULE | Refills: 5 | Status: CANCELLED | OUTPATIENT
Start: 2019-11-27 | End: 2020-05-25

## 2020-01-08 NOTE — PROGRESS NOTES
[address] [facility]   Cardiovascular Evaluation    PATIENT: Ave Jimenez  DATE: 2020  MRN: <[id number]>  CSN: [id number]  : 1965      Primary Care Doctor: Virginia He MD  Reason for evaluation:   Follow-up and Shortness of Breath      Subjective:   History of present illness on initial date of evaluation:   Ave Jimenez is a 49 y.o. patient who presents in f/u from a ER visit 14   He has been without known prior cardiac history who has not seen a physician for many years. He came to the emergency room at the urging of his fiancee because of chest pain and tingling in his hands. He was primarily concerned about the tingling of his hands, but we were asked to see him to evaluate the chest pain and abnormal stress test. He describes chest pain that feels like a pressure sensation in the center of his chest, and it is not necessarily exertional, and in fact sometimes it gets worse when he moves his left shoulder. He thought it was related to a rotator cuff injury. He is very nonspecific in the description of the pain. It does not radiate. He has no associated shortness of breath. He has no vomiting or diaphoresis. He is currently pain-free. He is diabetic  He also has had elevated blood pressures. He had a CTA 14 Ca score 221  11/15/13 he had a stress myoview showed some ischemia. Today he reports feeling ok. He states he has noticed he gets out of breath quicker. He states a week before Asmita he states he felt like he couldn't breath, but he was breathing ok. He states he did feel tightness in his chest at that time as well. He states it was not pain. He states he thought about going to the hospital. He states he thought maybe it was anxiety. He states he does go up and down a ladder due to his job and does ok for the most part. He denies dizziness or syncope.      Patient Active Problem List   Diagnosis    Chest pain    DM (diabetes mellitus), secondary uncontrolled ([facility] [address]    Polycythemia    SOB (shortness of breath)    HTN (hypertension)    CAD (coronary artery disease)    Deviated septum         Past Medical History:   has a past medical history of CAD (coronary artery disease), DM (diabetes mellitus) ([address] Hyperlipidemia, Hypertension, and MRSA (methicillin resistant staph aureus) culture positive. Surgical History:   has a past surgical history that includes Coronary angioplasty (10/20/14) and cardiovascular stress test (11/2013). Social History:   reports that he has never smoked. He has never used smokeless tobacco. He reports current alcohol use. He reports that he does not use drugs. Family History:  No evidence for sudden cardiac death or premature CAD    Home Medications:  Reviewed and are listed in nursing record. and/or listed below  Current Outpatient Medications   Medication Sig Dispense Refill    lisinopril (PRINIVIL;ZESTRIL) 5 MG tablet TAKE ONE TABLET BY MOUTH DAILY 90 tablet 1    atorvastatin (LIPITOR) 20 MG tablet TAKE ONE TABLET BY MOUTH DAILY 90 tablet 1    metFORMIN (GLUCOPHAGE) 1000 MG tablet TAKE ONE TABLET BY MOUTH TWICE A DAY WITH MEALS 180 tablet 1    glyBURIDE (DIABETA) 5 MG tablet TAKE ONE TABLET BY MOUTH DAILY WITH BREAKFAST 90 tablet 1    carvedilol (COREG) 6.25 MG tablet TAKE ONE TABLET BY MOUTH TWICE A DAY WITH MEALS 180 tablet 1    nitroGLYCERIN (NITROSTAT) 0.4 MG SL tablet DISSOLVE 1 TAB UNDER TONGUE FOR CHEST PAIN - IF PAIN REMAINS AFTER 5 MIN, CALL 911 AND REPEAT DOSE. MAX 3 TABS IN 15 MINUTES 25 tablet 0    aspirin 81 MG chewable tablet Take 1 tablet by mouth daily. 30 tablet 0    gabapentin (NEURONTIN) 100 MG capsule Take 1 capsule by mouth 2 times daily as needed (neuropathic pain) for up to 360 days. (Patient not taking: Reported on 1/9/2020) 60 capsule 2     No current facility-administered medications for this visit. Allergies:  Patient has no known allergies.      Review of Systems:   All 12 point review of symptoms completed. Pertinent positives identified in the HPI, all other review of symptoms negative as below.     Objective:   PHYSICAL EXAM:    Vitals:    01/09/20 0809   BP: 130/86   Pulse: 72   SpO2: 98%    Weight: 273 lb (123.8 kg)     Wt Readings from Last 3 Encounters:   01/09/20 273 lb (123.8 kg)   11/27/19 272 lb (123.4 kg)   09/24/18 276 lb (125.2 kg)         General Appearance:  Alert, cooperative, no distress, appears stated age   Head:  Normocephalic, atraumatic   Eyes:  PERRL, conjunctiva/corneas clear   Nose: Nares normal, no drainage or sinus tenderness   Throat: Lips, mucosa, and tongue normal   Neck: Supple, symmetrical, trachea midline, NL thyroid no carotid bruit or JVD   Lungs:   CTAB, respirations unlabored   Chest Wall:  No tenderness or deformity   Heart:  Regular rhythm and normal rate; S1, S2 are normal;   no murmur noted; no rub or gallop   Abdomen:   Soft, non-tender, +BS x 4, no masses, no organomegaly   Extremities: Extremities normal, atraumatic, no cyanosis or edema   Pulses: 2+ and symmetric   Skin: Skin color, texture, turgor normal, no rashes or lesions   Pysch: Normal mood and affect   Neurologic: Normal gross motor and sensory exam.         LABS   CBC:      Lab Results   Component Value Date    WBC 8.4 05/21/2018    RBC 5.68 05/21/2018    HGB 16.4 05/21/2018    HCT 46.9 05/21/2018    MCV 82.5 05/21/2018    RDW 14.3 05/21/2018     05/21/2018     CMP:  Lab Results   Component Value Date     05/21/2018    K 4.4 05/21/2018     05/21/2018    CO2 24 05/21/2018    BUN 12 05/21/2018    CREATININE 0.7 05/21/2018    GFRAA >60 05/21/2018    AGRATIO 1.5 05/21/2018    LABGLOM >60 05/21/2018    GLUCOSE 147 05/21/2018    PROT 7.5 05/21/2018    CALCIUM 9.6 05/21/2018    BILITOT 0.5 05/21/2018    ALKPHOS 53 05/21/2018    AST 22 05/21/2018    ALT 33 05/21/2018     PT/INR:   No components found for: PTPATIENT,  PTINR  Liver:  No components found for: CHLPL  Lab Results   Component Value Date    ALT 33 05/21/2018    AST 22 05/21/2018    ALKPHOS 53 05/21/2018    BILITOT 0.5 05/21/2018     Lab Results   Component Value Date    LABA1C 6.8 11/27/2019     Lipids:         Lab Results   Component Value Date    TRIG 131 06/26/2015    TRIG 74 10/20/2014    TRIG 130 04/21/2014            Lab Results   Component Value Date    HDL 34 (L) 06/26/2015    HDL 46 10/20/2014    HDL 46 04/21/2014            Lab Results   Component Value Date    LDLCALC 72 06/26/2015    LDLCALC 93 10/20/2014    LDLCALC 110 (H) 04/21/2014            Lab Results   Component Value Date    LABVLDL 26 06/26/2015    LABVLDL 15 10/20/2014    LABVLDL 26 04/21/2014         CARDIAC DATA   EKG:   10/16/2014- NSR, RSR pattern, LAD, no ischemia or infarcts  11/15/2013  NSR, no pathologic q waves. [address] [place] [address]:  11/15/13   Summary  Normal left ventricle size, wall thickness and systolic function with an  estimated ejection fraction of 55%. No regional wall motion abnormalities. No significant valvular abnormalities. STRESS TEST:  11/2015   Images obtained in 66 seconds with aid of Definity contrast.   Normal EKG response to exercise .   Normal stress ECHO.   Decreased sensitivity for ischemia due to failure to reach target heart   rate.     Rest HR: 75 bpm                 HR Response: Normal   Rest BP: 133/74 mmHg            BP Response: Normal   Stress Peak HR: 136 bpm         HR BP Product: [id number]   Stress Peak BP: 206/76 mmHg     Max Exercise: 10 METS   Predicted HR: 171 bpm   % of predicted HR: 80           Exercise Effort: Good   Test Duration: 8 min and 3 sec   Reason for Termination: Fatigue       CARDIAC CATH: 10/22/14  CORONARY ANGIOGRAPHY:    LEFT MAIN: A a large caliber vessel that trifurcated. It was normal.      LEFT CIRCUMFLEX:  A large caliber vessel that was dominant. It continued in  the posterior AV groove as a medium caliber vessel. It gave off 3 obtuse  marginal branches.  The first was medium, the

## 2020-01-09 ENCOUNTER — OFFICE VISIT (OUTPATIENT)
Dept: CARDIOLOGY CLINIC | Age: 55
End: 2020-01-09

## 2020-01-09 VITALS
HEART RATE: 72 BPM | BODY MASS INDEX: 36.98 KG/M2 | WEIGHT: 273 LBS | SYSTOLIC BLOOD PRESSURE: 130 MMHG | OXYGEN SATURATION: 98 % | DIASTOLIC BLOOD PRESSURE: 86 MMHG | HEIGHT: 72 IN

## 2020-01-09 PROCEDURE — 99214 OFFICE O/P EST MOD 30 MIN: CPT | Performed by: INTERNAL MEDICINE

## 2020-01-09 NOTE — LETTER
[address] [facility]   Cardiovascular Evaluation    PATIENT: Alessandro Calabrese  DATE: 2020  MRN: <[id number]>  CSN: [id number]  : 1965      Primary Care Doctor: Milo Sadler MD  Reason for evaluation:   Follow-up and Shortness of Breath      Subjective:   History of present illness on initial date of evaluation:   Alessandro Calabrese is a 49 y.o. patient who presents in f/u from a ER visit 14   He has been without known prior cardiac history who has not seen a physician for many years. He came to the emergency room at the urging of his fiancee because of chest pain and tingling in his hands. He was primarily concerned about the tingling of his hands, but we were asked to see him to evaluate the chest pain and abnormal stress test. He describes chest pain that feels like a pressure sensation in the center of his chest, and it is not necessarily exertional, and in fact sometimes it gets worse when he moves his left shoulder. He thought it was related to a rotator cuff injury. He is very nonspecific in the description of the pain. It does not radiate. He has no associated shortness of breath. He has no vomiting or diaphoresis. He is currently pain-free. He is diabetic  He also has had elevated blood pressures. He had a CTA 14 Ca score 221  11/15/13 he had a stress myoview showed some ischemia. Today he reports feeling ok. He states he has noticed he gets out of breath quicker. He states a week before  he states he felt like he couldn't breath, but he was breathing ok. He states he did feel tightness in his chest at that time as well. He states it was not pain. He states he thought about going to the hospital. He states he thought maybe it was anxiety. He states he does go up and down a ladder due to his job and does ok for the most part. He denies dizziness or syncope.      Patient Active Problem List   Diagnosis    Chest pain  DM (diabetes mellitus), secondary uncontrolled ([facility] [address]    Polycythemia    SOB (shortness of breath)    HTN (hypertension)    CAD (coronary artery disease)    Deviated septum         Past Medical History:   has a past medical history of CAD (coronary artery disease), DM (diabetes mellitus) ([facility] [address] Hyperlipidemia, Hypertension, and MRSA (methicillin resistant staph aureus) culture positive. Surgical History:   has a past surgical history that includes Coronary angioplasty (10/20/14) and cardiovascular stress test (11/2013). Social History:   reports that he has never smoked. He has never used smokeless tobacco. He reports current alcohol use. He reports that he does not use drugs. Family History:  No evidence for sudden cardiac death or premature CAD    Home Medications:  Reviewed and are listed in nursing record. and/or listed below  Current Outpatient Medications   Medication Sig Dispense Refill    lisinopril (PRINIVIL;ZESTRIL) 5 MG tablet TAKE ONE TABLET BY MOUTH DAILY 90 tablet 1    atorvastatin (LIPITOR) 20 MG tablet TAKE ONE TABLET BY MOUTH DAILY 90 tablet 1    metFORMIN (GLUCOPHAGE) 1000 MG tablet TAKE ONE TABLET BY MOUTH TWICE A DAY WITH MEALS 180 tablet 1    glyBURIDE (DIABETA) 5 MG tablet TAKE ONE TABLET BY MOUTH DAILY WITH BREAKFAST 90 tablet 1    carvedilol (COREG) 6.25 MG tablet TAKE ONE TABLET BY MOUTH TWICE A DAY WITH MEALS 180 tablet 1    nitroGLYCERIN (NITROSTAT) 0.4 MG SL tablet DISSOLVE 1 TAB UNDER TONGUE FOR CHEST PAIN - IF PAIN REMAINS AFTER 5 MIN, CALL 911 AND REPEAT DOSE. MAX 3 TABS IN 15 MINUTES 25 tablet 0    aspirin 81 MG chewable tablet Take 1 tablet by mouth daily. 30 tablet 0    gabapentin (NEURONTIN) 100 MG capsule Take 1 capsule by mouth 2 times daily as needed (neuropathic pain) for up to 360 days. (Patient not taking: Reported on 1/9/2020) 60 capsule 2     No current facility-administered medications for this visit.          Allergies: No components found for: PTPATIENT,  PTINR  Liver:  No components found for: CHLPL  Lab Results   Component Value Date    ALT 33 05/21/2018    AST 22 05/21/2018    ALKPHOS 53 05/21/2018    BILITOT 0.5 05/21/2018     Lab Results   Component Value Date    LABA1C 6.8 11/27/2019     Lipids:         Lab Results   Component Value Date    TRIG 131 06/26/2015    TRIG 74 10/20/2014    TRIG 130 04/21/2014            Lab Results   Component Value Date    HDL 34 (L) 06/26/2015    HDL 46 10/20/2014    HDL 46 04/21/2014            Lab Results   Component Value Date    LDLCALC 72 06/26/2015    LDLCALC 93 10/20/2014    LDLCALC 110 (H) 04/21/2014            Lab Results   Component Value Date    LABVLDL 26 06/26/2015    LABVLDL 15 10/20/2014    LABVLDL 26 04/21/2014         CARDIAC DATA   EKG:   10/16/2014- NSR, RSR pattern, LAD, no ischemia or infarcts  11/15/2013  NSR, no pathologic q waves. [address] [place] [address]:  11/15/13   Summary  Normal left ventricle size, wall thickness and systolic function with an  estimated ejection fraction of 55%. No regional wall motion abnormalities. No significant valvular abnormalities. STRESS TEST:  11/2015   Images obtained in 66 seconds with aid of Definity contrast.   Normal EKG response to exercise .   Normal stress ECHO.   Decreased sensitivity for ischemia due to failure to reach target heart   rate.     Rest HR: 75 bpm                 HR Response: Normal   Rest BP: 133/74 mmHg            BP Response: Normal   Stress Peak HR: 136 bpm         HR BP Product: [id number]   Stress Peak BP: 206/76 mmHg     Max Exercise: 10 METS   Predicted HR: 171 bpm   % of predicted HR: 80           Exercise Effort: Good   Test Duration: 8 min and 3 sec   Reason for Termination: Fatigue       CARDIAC CATH: 10/22/14  CORONARY ANGIOGRAPHY:    LEFT MAIN: A a large caliber vessel that trifurcated. It was normal.      LEFT CIRCUMFLEX:  A large caliber vessel that was dominant.   It continued in the posterior AV groove as a medium caliber vessel. It gave off 3 obtuse  marginal branches. The first was medium, the second was small and the third  was small to medium. The first was free of significant disease. The second  one which was small had a 50% ostial stenosis. In the posterior AV groove it  also gave off 2 medium caliber left posterolateral branches as well asa  small to medium caliber left posterior descending artery. There was a fistulous    communication between proximal LAD and main pulmonary trunk. LAD:  A large caliber vessel that had mild diffuse disease of 20% from  proximal to mid segment. LAD gave off 2 diagonal branches. The first was  medium and the second was small. RAMUS:  A small to medium caliber non-dominant vessel and had a 20% proximal  stenosis. RIGHT CORONARY ARTERY:  Small and nondominant. There was a fistulous    communication between the conus branch and main pulmonary trunk. IMPRESSIONS:     1.  Mild coronary artery disease involving the left anterior descending and  circumflex arteries. 2. Normal left ventricular systolic function with an ejection fraction of  55%. 3.  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 However, there is no  oxygen step-up noted on the right heart catheterization with a saturation    of 76% in the right ventricle and a saturation of 76% in the pulmonary  artery branches. 4.  Normal pulmonary artery pressure. 5.  Normal cardiac index, 2.88 L/min/m2. VASCULAR/OTHER IMAGIN13 CTA  Scattered calcified and noncalcified plaque. No flow limiting coronary artery stenosis noted. It is a left dominant circulation with a small right coronary artery.  Coronary artery calcium score 221      Assessment and Plan   Hernesto Stevens is a 49 y.o. male who presents today for the following problems: 8:26 AM

## 2020-01-09 NOTE — PATIENT INSTRUCTIONS
Patient Plan:  1. Stress/Echo (Dobutamine) to risk stratify   ~do not hold your medications   2.   We will call you with the results

## 2020-02-04 ENCOUNTER — HOSPITAL ENCOUNTER (OUTPATIENT)
Dept: NON INVASIVE DIAGNOSTICS | Age: 55
Discharge: HOME | End: 2020-02-04

## 2020-02-04 VITALS — WEIGHT: 273 LBS | HEIGHT: 71 IN | BODY MASS INDEX: 38.22 KG/M2

## 2020-02-04 PROCEDURE — 93351 STRESS TTE COMPLETE: CPT

## 2020-02-04 PROCEDURE — 6360000004 HC RX CONTRAST MEDICATION: Performed by: INTERNAL MEDICINE

## 2020-02-04 RX ORDER — DOBUTAMINE HYDROCHLORIDE 100 MG/100ML
10 INJECTION INTRAVENOUS CONTINUOUS
Status: DISCONTINUED | OUTPATIENT
Start: 2020-02-04 | End: 2020-02-04

## 2020-02-04 RX ADMIN — PERFLUTREN 2.2 MG: 6.52 INJECTION, SUSPENSION INTRAVENOUS at 10:39

## 2020-02-05 ENCOUNTER — TELEPHONE (OUTPATIENT)
Dept: CARDIOLOGY CLINIC | Age: 55
End: 2020-02-05

## 2020-02-05 NOTE — TELEPHONE ENCOUNTER
----- Message from Pacheco Salgado MD sent at 2/4/2020  3:50 PM EST -----  Let pt know that stress echo was ok

## 2020-02-06 NOTE — TELEPHONE ENCOUNTER
Pt had dobutamine test and can occur from the medicine. Would only suggest that pt exercise as possible.  Goal 30min, 3-5x a wk, will help condition the heart

## 2020-02-07 NOTE — TELEPHONE ENCOUNTER
Spoke with patient. Relayed message regarding BP symptoms. Voiced understanding regarding that. But he states he continues to have breathing issues. He states he is still SOB. He is afraid to exercise because he feels hes going to die of a heart attack. He is very indecisive. Questioned regarding a LHC. Reviewed this with him. He is going to talk to his girlfriend. He will call us back and let us know what he wants to do.

## 2020-02-07 NOTE — TELEPHONE ENCOUNTER
Pt called back with questions re: test. Pt also state if you don't reach him, he will call back w/in 2 - 3 minutes as he works outside and on ladders.

## 2020-02-27 ENCOUNTER — OFFICE VISIT (OUTPATIENT)
Dept: INTERNAL MEDICINE CLINIC | Age: 55
End: 2020-02-27

## 2020-02-27 VITALS
OXYGEN SATURATION: 96 % | DIASTOLIC BLOOD PRESSURE: 80 MMHG | SYSTOLIC BLOOD PRESSURE: 124 MMHG | WEIGHT: 276 LBS | HEIGHT: 72 IN | HEART RATE: 70 BPM | BODY MASS INDEX: 37.38 KG/M2

## 2020-02-27 LAB — HBA1C MFR BLD: 7.4 %

## 2020-02-27 PROCEDURE — 3051F HG A1C>EQUAL 7.0%<8.0%: CPT | Performed by: INTERNAL MEDICINE

## 2020-02-27 PROCEDURE — 83036 HEMOGLOBIN GLYCOSYLATED A1C: CPT | Performed by: INTERNAL MEDICINE

## 2020-02-27 PROCEDURE — 99214 OFFICE O/P EST MOD 30 MIN: CPT | Performed by: INTERNAL MEDICINE

## 2020-02-27 RX ORDER — SILDENAFIL 100 MG/1
100 TABLET, FILM COATED ORAL PRN
Qty: 20 TABLET | Refills: 3 | Status: SHIPPED | OUTPATIENT
Start: 2020-02-27

## 2020-02-27 NOTE — PATIENT INSTRUCTIONS
1. Type 2 Diabetes:  Hemoglobin A1c increased from 6.8% (Nov 2019) to 7.4% today (goal less than 7.0%). Continue taking metformin 1000 mg twice per day AND glyburide 5 mg ONCE per day in the morning.   Increased activity definitely causes lower blood sugar. 2. Hypertension:  Blood pressure is reasonable today at 124/80, goal less than 130/90.  Continue taking lisinopril 5 mg once per day and carvedilol (Coreg) 6.25 mg twice per day.    3. History of heart disease:  Continue taking aspirin 81 mg daily. Dobutamine stress echocardiogram (Feb 4) reported as normal.    4. Cholesterol:  Continue taking atorvastatin (Lipitor) 20 mg every evening. 5. Insomnia:   You can take melatonin 3-10 mg at 9 pm every night scheduled.   6. Erectile dysfunction:  Do not take Viagra within 48 hours of ANY nitroglycerin.       Follow-up as needed or three months

## 2020-02-27 NOTE — PROGRESS NOTES
SUBJECTIVE:   Follow up from 11/27/19 for diabetes, hypertension, and insomnia. MEDICATIONS:  gabapentin (NEURONTIN) 100 MG capsule Take 1 capsule by mouth 2 times daily as needed    lisinopril (PRINIVIL;ZESTRIL) 5 MG tablet TAKE ONE TABLET BY MOUTH DAILY   atorvastatin (LIPITOR) 20 MG tablet TAKE ONE TABLET BY MOUTH DAILY   metFORMIN (GLUCOPHAGE) 1000 MG tablet TAKE ONE TABLET BY MOUTH TWICE A DAY WITH MEALS   glyBURIDE (DIABETA) 5 MG tablet TAKE ONE TABLET BY MOUTH DAILY WITH BREAKFAST   carvedilol (COREG) 6.25 MG tablet TAKE ONE TABLET BY MOUTH TWICE A DAY WITH MEALS   nitroGLYCERIN (NITROSTAT) 0.4 MG SL tablet DISSOLVE 1 TAB UNDER TONGUE FOR CHEST PAIN - IF PAIN REMAINS AFTER 5 MIN, CALL 911 AND REPEAT DOSE. MAX 3 TABS IN 15 MINUTES   aspirin 81 MG chewable tablet Take 1 tablet by mouth daily. Hemoglobin A1c 7.4%    Dobutamine echo (2/4) Baseline EKG is within normal limits. Dobutamine was used for stress testing. Hypertensive response to stress. Rare isolated PVC during recovery. Patient developed tingling in extremities, likely related to dobutamine. Symptoms resolved with rest.  Images obtained with aid of Definity contrast.  The patient had a Dobutamine Stress Echocardiogram. Baseline echocardiogram shows normal left ventricular function with an estimated ejection fraction of 60% . Following the dobutamine infusion there was augmentation of all  segments with no areas of stress induced hypokinesis. OBJECTIVE:    /80   Pulse 70   Ht 6' (1.829 m)   Wt 276 lb (125.2 kg)   SpO2 96%   BMI 37.43 kg/m²   HEENT:  Oropharynx clear, no lymphadenopathy,   LUNGS:  Clear and without wheezes  HEART:  Regular rhythm, no appreciable murmur  ABD:  Benign, active bowel sounds  EXT:  No edema, pulses palpable  NEURO:  No focal neurologic deficits noted. ASSESSMENT / PLAN:   1. Type 2 Diabetes:  Hemoglobin A1c increased from 6.8% (Nov 2019) to 7.4% today (goal less than 7.0%).   Continue taking metformin 1000 mg twice per day AND glyburide 5 mg ONCE per day in the morning.   Increased activity definitely causes lower blood sugar. 2. Hypertension:  Blood pressure is reasonable today at 124/80, goal less than 130/90.  Continue taking lisinopril 5 mg once per day and carvedilol (Coreg) 6.25 mg twice per day.    3. History of heart disease:  Continue taking aspirin 81 mg daily. Dobutamine stress echocardiogram (Feb 4) reported as normal.    4. Cholesterol:  Continue taking atorvastatin (Lipitor) 20 mg every evening. 5. Insomnia:   You can take melatonin 3-10 mg at 9 pm every night scheduled.   6. Erectile dysfunction:  Do not take Viagra within 48 hours of ANY nitroglycerin.       Follow-up as needed or three months

## 2020-03-11 ENCOUNTER — TELEPHONE (OUTPATIENT)
Dept: CARDIOLOGY CLINIC | Age: 55
End: 2020-03-11

## 2020-03-11 RX ORDER — NITROGLYCERIN 0.4 MG/1
TABLET SUBLINGUAL
Qty: 25 TABLET | Refills: 3 | Status: SHIPPED | OUTPATIENT
Start: 2020-03-11 | End: 2022-10-27 | Stop reason: SDUPTHER

## 2020-06-11 RX ORDER — GLYBURIDE 5 MG/1
TABLET ORAL
Qty: 90 TABLET | Refills: 1 | Status: SHIPPED | OUTPATIENT
Start: 2020-06-11 | End: 2020-12-10

## 2020-06-11 RX ORDER — LISINOPRIL 5 MG/1
TABLET ORAL
Qty: 90 TABLET | Refills: 1 | Status: SHIPPED | OUTPATIENT
Start: 2020-06-11 | End: 2020-12-10

## 2020-06-11 RX ORDER — CARVEDILOL 6.25 MG/1
TABLET ORAL
Qty: 180 TABLET | Refills: 1 | Status: SHIPPED | OUTPATIENT
Start: 2020-06-11 | End: 2020-12-10

## 2020-06-11 RX ORDER — ATORVASTATIN CALCIUM 20 MG/1
TABLET, FILM COATED ORAL
Qty: 90 TABLET | Refills: 1 | Status: SHIPPED | OUTPATIENT
Start: 2020-06-11 | End: 2020-12-10

## 2020-12-10 RX ORDER — GLYBURIDE 5 MG/1
TABLET ORAL
Qty: 90 TABLET | Refills: 3 | Status: SHIPPED | OUTPATIENT
Start: 2020-12-10 | End: 2021-12-27

## 2020-12-10 RX ORDER — LISINOPRIL 5 MG/1
TABLET ORAL
Qty: 90 TABLET | Refills: 3 | Status: SHIPPED | OUTPATIENT
Start: 2020-12-10 | End: 2021-12-27

## 2020-12-10 RX ORDER — ATORVASTATIN CALCIUM 20 MG/1
TABLET, FILM COATED ORAL
Qty: 90 TABLET | Refills: 3 | Status: SHIPPED | OUTPATIENT
Start: 2020-12-10 | End: 2021-12-27

## 2020-12-10 RX ORDER — CARVEDILOL 6.25 MG/1
TABLET ORAL
Qty: 180 TABLET | Refills: 3 | Status: SHIPPED | OUTPATIENT
Start: 2020-12-10 | End: 2021-12-27

## 2021-03-25 ENCOUNTER — OFFICE VISIT (OUTPATIENT)
Dept: INTERNAL MEDICINE CLINIC | Age: 56
End: 2021-03-25

## 2021-03-25 VITALS
TEMPERATURE: 98 F | DIASTOLIC BLOOD PRESSURE: 80 MMHG | SYSTOLIC BLOOD PRESSURE: 128 MMHG | WEIGHT: 271 LBS | OXYGEN SATURATION: 97 % | HEART RATE: 84 BPM | HEIGHT: 72 IN | BODY MASS INDEX: 36.7 KG/M2

## 2021-03-25 DIAGNOSIS — J30.1 SEASONAL ALLERGIC RHINITIS DUE TO POLLEN: ICD-10-CM

## 2021-03-25 DIAGNOSIS — F51.01 PRIMARY INSOMNIA: ICD-10-CM

## 2021-03-25 DIAGNOSIS — I25.10 CORONARY ARTERY DISEASE DUE TO LIPID RICH PLAQUE: ICD-10-CM

## 2021-03-25 DIAGNOSIS — I25.83 CORONARY ARTERY DISEASE DUE TO LIPID RICH PLAQUE: ICD-10-CM

## 2021-03-25 DIAGNOSIS — J01.00 ACUTE NON-RECURRENT MAXILLARY SINUSITIS: ICD-10-CM

## 2021-03-25 DIAGNOSIS — I10 ESSENTIAL HYPERTENSION: ICD-10-CM

## 2021-03-25 DIAGNOSIS — E11.41 TYPE 2 DIABETES MELLITUS WITH DIABETIC MONONEUROPATHY, WITHOUT LONG-TERM CURRENT USE OF INSULIN (HCC): Primary | ICD-10-CM

## 2021-03-25 LAB — HBA1C MFR BLD: 9.1 %

## 2021-03-25 PROCEDURE — 99214 OFFICE O/P EST MOD 30 MIN: CPT | Performed by: INTERNAL MEDICINE

## 2021-03-25 PROCEDURE — 83036 HEMOGLOBIN GLYCOSYLATED A1C: CPT | Performed by: INTERNAL MEDICINE

## 2021-03-25 RX ORDER — AZITHROMYCIN 250 MG/1
250 TABLET, FILM COATED ORAL SEE ADMIN INSTRUCTIONS
Qty: 6 TABLET | Refills: 0 | Status: SHIPPED | OUTPATIENT
Start: 2021-03-25 | End: 2022-08-22 | Stop reason: SDUPTHER

## 2021-03-25 ASSESSMENT — PATIENT HEALTH QUESTIONNAIRE - PHQ9
1. LITTLE INTEREST OR PLEASURE IN DOING THINGS: 0
SUM OF ALL RESPONSES TO PHQ QUESTIONS 1-9: 0

## 2021-03-25 NOTE — PATIENT INSTRUCTIONS
1. Maxillary sinusitis:  Take azithromycin (Z-jacinta) 500 mg (two tablets) today and then 250 mg (one tablet) daily to complete five days. 2. Allergic rhinitis: This is causing post nasal drainage induced cough and sore throat, especially at night. Use Flonase (fluticasone) nasal spray, two sprays in each nostril every night at bedtime and use nasal saline (Ocean spray) before the Flonase at bedtime and several times during the day especially in the morning to keep nasal secretions from drying out. 3. Type 2 Diabetes:  Hemoglobin A1c (three month average blood glucose) has increased from 7.4% (Feb 2020) to 9.1% today (goal less than 7.0%).   Continue taking metformin 1000 mg twice per day AND glyburide 5 mg ONCE per day in the morning.   Increased activity definitely causes lower blood sugar. 4. Hypertension:  Blood pressure is reasonable today at 128/80, goal less than 130/90.  Continue taking lisinopril 5 mg once per day and carvedilol (Coreg) 6.25 mg twice per day.    5. History of heart disease:  Continue taking aspirin 81 mg daily. 6. Cholesterol:  Continue taking atorvastatin (Lipitor) 20 mg every evening.   7. Insomnia:   You can take melatonin 3-10 mg at 9 pm every night scheduled.       Follow-up in 1-2 weeks

## 2021-03-25 NOTE — PROGRESS NOTES
SUBJECTIVE:   Acute visit for left maxillary tenderness and swelling. Chief Complaint   Patient presents with    Sinus Problem     L side of face, swollen     Diabetes     forgot to take his glyburide for a couple of months. back on it for a month         MEDICATIONS:  atorvastatin (LIPITOR) 20 MG tablet TAKE ONE TABLET BY MOUTH DAILY   carvedilol (COREG) 6.25 MG tablet TAKE ONE TABLET BY MOUTH TWICE A DAY WITH A MEAL   lisinopril (PRINIVIL;ZESTRIL) 5 MG tablet TAKE ONE TABLET BY MOUTH DAILY   metFORMIN (GLUCOPHAGE) 1000 MG tablet TAKE ONE TABLET BY MOUTH TWICE A DAY   glyBURIDE (DIABETA) 5 MG tablet TAKE ONE TABLET BY MOUTH DAILY   nitroGLYCERIN (NITROSTAT) 0.4 MG SL tablet DISSOLVE 1 TAB UNDER TONGUE FOR CHEST PAIN -   sildenafil (VIAGRA) 100 MG tablet Take 1 tablet by mouth as needed for Erectile Dysfunction   gabapentin (NEURONTIN) 100 MG capsule Take 1 capsule by mouth 2 times daily as needed    aspirin 81 MG chewable tablet Take 1 tablet by mouth daily. Lab Results   Component Value Date    LABA1C 9.1 03/25/2021     Lab Results   Component Value Date    WBC 8.4 05/21/2018    HGB 16.4 05/21/2018     05/21/2018    MCV 82.5 05/21/2018     Lab Results   Component Value Date     05/21/2018    K 4.4 05/21/2018     05/21/2018    CO2 24 05/21/2018    BUN 12 05/21/2018    CREATININE 0.7 (L) 05/21/2018    GLUCOSE 147 (H) 05/21/2018         OBJECTIVE:    /80 (Site: Right Upper Arm, Position: Sitting)   Pulse 84   Temp 98 °F (36.7 °C)   Ht 6' (1.829 m)   Wt 271 lb (122.9 kg)   SpO2 97%   BMI 36.75 kg/m²   HEENT:  Oropharynx cobblestoning, nasal mucosa red / boggy, no appreciable lymphadenopathy, left maxillary tenderness, semi-purulent nasal discharge  LUNGS:  Clear and without wheezes  HEART:  Regular rhythm, no appreciable murmur  ABD:  Benign, active bowel sounds  EXT:  No edema, pulses palpable  NEURO:  No focal neurologic deficits noted. ASSESSMENT / PLAN:   1.  Maxillary sinusitis:  Take azithromycin (Z-jacinta) 500 mg (two tablets) today and then 250 mg (one tablet) daily to complete five days. 2. Allergic rhinitis: This is causing post nasal drainage induced cough and sore throat, especially at night. Use Flonase (fluticasone) nasal spray, two sprays in each nostril every night at bedtime and use nasal saline (Ocean spray) before the Flonase at bedtime and several times during the day especially in the morning to keep nasal secretions from drying out. 3. Type 2 Diabetes:  Hemoglobin A1c (three month average blood glucose) has increased from 7.4% (Feb 2020) to 9.1% today (goal less than 7.0%).   Continue taking metformin 1000 mg twice per day AND glyburide 5 mg ONCE per day in the morning.   Increased activity definitely causes lower blood sugar. 4. Hypertension:  Blood pressure is reasonable today at 128/80, goal less than 130/90.  Continue taking lisinopril 5 mg once per day and carvedilol (Coreg) 6.25 mg twice per day.    5. History of heart disease:  Continue taking aspirin 81 mg daily. 6. Cholesterol:  Continue taking atorvastatin (Lipitor) 20 mg every evening.   7. Insomnia:   You can take melatonin 3-10 mg at 9 pm every night scheduled.       Follow-up in 2 weeks (04/08/2021)

## 2021-12-27 RX ORDER — CARVEDILOL 6.25 MG/1
TABLET ORAL
Qty: 180 TABLET | Refills: 0 | Status: SHIPPED | OUTPATIENT
Start: 2021-12-27 | End: 2022-03-22

## 2021-12-27 RX ORDER — LISINOPRIL 5 MG/1
TABLET ORAL
Qty: 90 TABLET | Refills: 0 | Status: SHIPPED | OUTPATIENT
Start: 2021-12-27 | End: 2022-03-22

## 2021-12-27 RX ORDER — GLYBURIDE 5 MG/1
TABLET ORAL
Qty: 90 TABLET | Refills: 0 | Status: SHIPPED | OUTPATIENT
Start: 2021-12-27 | End: 2022-03-22

## 2021-12-27 RX ORDER — ATORVASTATIN CALCIUM 20 MG/1
TABLET, FILM COATED ORAL
Qty: 90 TABLET | Refills: 0 | Status: SHIPPED | OUTPATIENT
Start: 2021-12-27 | End: 2022-03-22

## 2021-12-27 NOTE — TELEPHONE ENCOUNTER
Requested Prescriptions     Pending Prescriptions Disp Refills    glyBURIDE (DIABETA) 5 MG tablet [Pharmacy Med Name: glyBURIDE 5 MG TABLET (D)] 90 tablet 3     Sig: TAKE ONE TABLET BY MOUTH DAILY    carvedilol (COREG) 6.25 MG tablet [Pharmacy Med Name: CARVEDILOL 6.25 MG TABLET] 180 tablet 3     Sig: TAKE ONE TABLET BY MOUTH TWICE A DAY WITH A MEAL    lisinopril (PRINIVIL;ZESTRIL) 5 MG tablet [Pharmacy Med Name: LISINOPRIL 5 MG TABLET] 90 tablet 3     Sig: TAKE ONE TABLET BY MOUTH DAILY    atorvastatin (LIPITOR) 20 MG tablet [Pharmacy Med Name: ATORVASTATIN 20 MG TABLET] 90 tablet 3     Sig: TAKE ONE TABLET BY MOUTH DAILY    metFORMIN (GLUCOPHAGE) 1000 MG tablet [Pharmacy Med Name: metFORMIN HCL 1,000 MG TABLET] 180 tablet 3     Sig: TAKE ONE TABLET BY MOUTH TWICE A DAY       Due for a follow up LOV 03/25/2021

## 2022-03-22 RX ORDER — LISINOPRIL 5 MG/1
TABLET ORAL
Qty: 90 TABLET | Refills: 0 | Status: SHIPPED | OUTPATIENT
Start: 2022-03-22 | End: 2022-07-25

## 2022-03-22 RX ORDER — CARVEDILOL 6.25 MG/1
TABLET ORAL
Qty: 180 TABLET | Refills: 0 | Status: SHIPPED | OUTPATIENT
Start: 2022-03-22 | End: 2022-07-25

## 2022-03-22 RX ORDER — GLYBURIDE 5 MG/1
TABLET ORAL
Qty: 90 TABLET | Refills: 0 | Status: SHIPPED | OUTPATIENT
Start: 2022-03-22 | End: 2022-07-25

## 2022-03-22 RX ORDER — ATORVASTATIN CALCIUM 20 MG/1
TABLET, FILM COATED ORAL
Qty: 90 TABLET | Refills: 0 | Status: SHIPPED | OUTPATIENT
Start: 2022-03-22 | End: 2022-07-25

## 2022-03-22 NOTE — TELEPHONE ENCOUNTER
Due for a follow up    Requested Prescriptions     Pending Prescriptions Disp Refills    carvedilol (COREG) 6.25 MG tablet [Pharmacy Med Name: CARVEDILOL 6.25 MG TABLET] 180 tablet 0     Sig: TAKE ONE TABLET BY MOUTH TWICE A DAY WITH MEALS    glyBURIDE (DIABETA) 5 MG tablet [Pharmacy Med Name: glyBURIDE 5 MG TABLET (D)] 90 tablet 0     Sig: TAKE ONE TABLET BY MOUTH DAILY    lisinopril (PRINIVIL;ZESTRIL) 5 MG tablet [Pharmacy Med Name: LISINOPRIL 5 MG TABLET] 90 tablet 0     Sig: TAKE ONE TABLET BY MOUTH DAILY    atorvastatin (LIPITOR) 20 MG tablet [Pharmacy Med Name: ATORVASTATIN 20 MG TABLET] 90 tablet 0     Sig: TAKE ONE TABLET BY MOUTH DAILY    metFORMIN (GLUCOPHAGE) 1000 MG tablet [Pharmacy Med Name: metFORMIN HCL 1,000 MG TABLET] 180 tablet 0     Sig: TAKE ONE TABLET BY MOUTH TWICE A DAY

## 2022-06-23 ENCOUNTER — OFFICE VISIT (OUTPATIENT)
Dept: INTERNAL MEDICINE CLINIC | Age: 57
End: 2022-06-23

## 2022-06-23 VITALS
BODY MASS INDEX: 35.21 KG/M2 | HEART RATE: 74 BPM | DIASTOLIC BLOOD PRESSURE: 76 MMHG | OXYGEN SATURATION: 97 % | HEIGHT: 72 IN | WEIGHT: 260 LBS | SYSTOLIC BLOOD PRESSURE: 140 MMHG

## 2022-06-23 DIAGNOSIS — J30.1 SEASONAL ALLERGIC RHINITIS DUE TO POLLEN: ICD-10-CM

## 2022-06-23 DIAGNOSIS — E11.41 TYPE 2 DIABETES MELLITUS WITH DIABETIC MONONEUROPATHY, WITHOUT LONG-TERM CURRENT USE OF INSULIN (HCC): Primary | ICD-10-CM

## 2022-06-23 DIAGNOSIS — I25.83 CORONARY ARTERY DISEASE DUE TO LIPID RICH PLAQUE: ICD-10-CM

## 2022-06-23 DIAGNOSIS — I10 ESSENTIAL HYPERTENSION: ICD-10-CM

## 2022-06-23 DIAGNOSIS — I25.10 CORONARY ARTERY DISEASE DUE TO LIPID RICH PLAQUE: ICD-10-CM

## 2022-06-23 DIAGNOSIS — F51.01 PRIMARY INSOMNIA: ICD-10-CM

## 2022-06-23 PROCEDURE — 83036 HEMOGLOBIN GLYCOSYLATED A1C: CPT | Performed by: INTERNAL MEDICINE

## 2022-06-23 PROCEDURE — 99214 OFFICE O/P EST MOD 30 MIN: CPT | Performed by: INTERNAL MEDICINE

## 2022-06-23 RX ORDER — NITROGLYCERIN 0.4 MG/1
TABLET SUBLINGUAL
Qty: 25 TABLET | Refills: 3 | Status: CANCELLED | OUTPATIENT
Start: 2022-06-23

## 2022-06-23 RX ORDER — CITALOPRAM 20 MG/1
20 TABLET ORAL DAILY
Qty: 30 TABLET | Refills: 5 | Status: SHIPPED | OUTPATIENT
Start: 2022-06-23 | End: 2022-10-27 | Stop reason: SDUPTHER

## 2022-06-23 NOTE — PATIENT INSTRUCTIONS
1. Type 2 Diabetes:  We will check hemoglobin A1c (three month average blood glucose) today. Last check was 9.1% (goal less than 7.0%) on March 2021. Continue taking metformin 1000 mg twice per day AND glyburide 5 mg ONCE per day in the morning.    2. Hypertension:  Blood pressure borderline today at 140/76, goal less than 130/90.  Continue taking lisinopril 5 mg once per day and carvedilol (Coreg) 6.25 mg twice per day.  Check morning blood pressure before medication 1-2 times per week, write down and bring to clinic. 3. History of heart disease:  Continue taking aspirin 81 mg daily.   4. Cholesterol:  Continue taking atorvastatin (Lipitor) 20 mg every evening.   5. Serotonin depletion with insomnia, anxiety, and elevated diastolic blood pressure:  START taking citalopram (Celexa) 10 mg (half tablet) once per day in the evening for a week, then 20 mg (whole tablet) every evening. 6. Left neck pain and spasm:  Try using moist heat (wet towel heated in microwave) 3-4 times per day. 7. Allergic rhinitis: This is causing post nasal drainage induced cough and sore throat, especially at night and vertigo. Use Flonase (fluticasone) nasal spray, two sprays in each nostril every night at bedtime and use nasal saline (Ocean spray) before the Flonase at bedtime and several times during the day especially in the morning to keep nasal secretions from drying out.         Follow-up in three weeks

## 2022-06-23 NOTE — PROGRESS NOTES
SUBJECTIVE:   Follow up from 3/25/21 for diabetes, hypertension, and anxiety with difficulty sleeping. Left neck and shoulder pain with movement. Anxiety worse over the past two months. MEDICATIONS:  carvedilol (COREG) 6.25 MG tablet TAKE ONE TABLET BY MOUTH TWICE A DAY WITH MEALS   glyBURIDE (DIABETA) 5 MG tablet TAKE ONE TABLET BY MOUTH DAILY   lisinopril (PRINIVIL;ZESTRIL) 5 MG tablet TAKE ONE TABLET BY MOUTH DAILY   atorvastatin (LIPITOR) 20 MG tablet TAKE ONE TABLET BY MOUTH DAILY   metFORMIN (GLUCOPHAGE) 1000 MG tablet TAKE ONE TABLET BY MOUTH TWICE A DAY   nitroGLYCERIN (NITROSTAT) 0.4 MG SL tablet DISSOLVE 1 TAB UNDER TONGUE FOR CHEST PAIN - IF PAIN REMAINS AFTER 5 MIN, CALL 911 AND REPEAT DOSE. MAX 3 TABS IN 15 MINUTES   sildenafil (VIAGRA) 100 MG tablet Take 1 tablet by mouth as needed for Erectile Dysfunction   aspirin 81 MG chewable tablet Take 1 tablet by mouth daily. Lab Results   Component Value Date    LABA1C 9.1 03/25/2021     Lab Results   Component Value Date    WBC 8.4 05/21/2018    HGB 16.4 05/21/2018     05/21/2018    MCV 82.5 05/21/2018     Lab Results   Component Value Date     05/21/2018    K 4.4 05/21/2018     05/21/2018    CO2 24 05/21/2018    BUN 12 05/21/2018    CREATININE 0.7 (L) 05/21/2018    GLUCOSE 147 (H) 05/21/2018       OBJECTIVE:    BP (!) 140/76 (Site: Left Upper Arm, Position: Sitting)   Pulse 74   Ht 6' (1.829 m)   Wt 260 lb (117.9 kg)   SpO2 97%   BMI 35.26 kg/m²   HEENT:  Oropharynx clear, no lymphadenopathy,   LUNGS:  Clear and without wheezes  HEART:  Regular rhythm, no appreciable murmur  ABD:  Benign, active bowel sounds  EXT:  No edema, pulses palpable  NEURO:  No focal neurologic deficits noted. ASSESSMENT / PLAN:   1. Type 2 Diabetes:  We will check hemoglobin A1c (three month average blood glucose) today.   Last check was 9.1% (goal less than 7.0%) on March 2021. Continue taking metformin 1000 mg twice per day AND glyburide 5 mg ONCE per day in the morning.    2. Hypertension:  Blood pressure borderline today at 140/76, goal less than 130/90.  Continue taking lisinopril 5 mg once per day and carvedilol (Coreg) 6.25 mg twice per day.  Check morning blood pressure before medication 1-2 times per week, write down and bring to clinic. 3. History of heart disease:  Continue taking aspirin 81 mg daily.   4. Cholesterol:  Continue taking atorvastatin (Lipitor) 20 mg every evening.   5. Serotonin depletion with insomnia, anxiety, and elevated diastolic blood pressure:  START taking citalopram (Celexa) 10 mg (half tablet) once per day in the evening for a week, then 20 mg (whole tablet) every evening. 6. Left neck pain and spasm:  Try using moist heat (wet towel heated in microwave) 3-4 times per day. 7. Allergic rhinitis: This is causing post nasal drainage induced cough and sore throat, especially at night and vertigo. Use Flonase (fluticasone) nasal spray, two sprays in each nostril every night at bedtime and use nasal saline (Ocean spray) before the Flonase at bedtime and several times during the day especially in the morning to keep nasal secretions from drying out.         Follow-up in three weeks  LABS:  PSA, CBC, CMP, TSH

## 2022-07-25 RX ORDER — GLYBURIDE 5 MG/1
TABLET ORAL
Qty: 90 TABLET | Refills: 0 | Status: SHIPPED | OUTPATIENT
Start: 2022-07-25 | End: 2022-10-27 | Stop reason: SDUPTHER

## 2022-07-25 RX ORDER — LISINOPRIL 5 MG/1
TABLET ORAL
Qty: 90 TABLET | Refills: 0 | Status: SHIPPED | OUTPATIENT
Start: 2022-07-25 | End: 2022-10-27 | Stop reason: SDUPTHER

## 2022-07-25 RX ORDER — CARVEDILOL 6.25 MG/1
TABLET ORAL
Qty: 180 TABLET | Refills: 0 | Status: SHIPPED | OUTPATIENT
Start: 2022-07-25 | End: 2022-10-27 | Stop reason: SDUPTHER

## 2022-07-25 RX ORDER — ATORVASTATIN CALCIUM 20 MG/1
TABLET, FILM COATED ORAL
Qty: 90 TABLET | Refills: 0 | Status: SHIPPED | OUTPATIENT
Start: 2022-07-25 | End: 2022-08-15 | Stop reason: SDUPTHER

## 2022-07-25 NOTE — TELEPHONE ENCOUNTER
Requested Prescriptions     Pending Prescriptions Disp Refills    carvedilol (COREG) 6.25 MG tablet [Pharmacy Med Name: CARVEDILOL 6.25 MG TABLET] 180 tablet 0     Sig: TAKE ONE TABLET BY MOUTH TWICE A DAY WITH MEALS    glyBURIDE (DIABETA) 5 MG tablet [Pharmacy Med Name: glyBURIDE 5 MG TABLET (D)] 90 tablet 0     Sig: TAKE ONE TABLET BY MOUTH DAILY    lisinopril (PRINIVIL;ZESTRIL) 5 MG tablet [Pharmacy Med Name: LISINOPRIL 5 MG TABLET] 90 tablet 0     Sig: TAKE ONE TABLET BY MOUTH DAILY    atorvastatin (LIPITOR) 20 MG tablet [Pharmacy Med Name: ATORVASTATIN 20 MG TABLET] 90 tablet 0     Sig: TAKE ONE TABLET BY MOUTH DAILY    metFORMIN (GLUCOPHAGE) 1000 MG tablet [Pharmacy Med Name: metFORMIN HCL 1,000 MG TABLET] 180 tablet 0     Sig: TAKE ONE TABLET BY MOUTH TWICE A DAY

## 2022-08-11 ENCOUNTER — HOSPITAL ENCOUNTER (OUTPATIENT)
Age: 57
Discharge: HOME | End: 2022-08-11

## 2022-08-11 DIAGNOSIS — E11.41 TYPE 2 DIABETES MELLITUS WITH DIABETIC MONONEUROPATHY, WITHOUT LONG-TERM CURRENT USE OF INSULIN (HCC): ICD-10-CM

## 2022-08-11 LAB
A/G RATIO: 1.4 (ref 1.1–2.2)
ALBUMIN SERPL-MCNC: 4.4 G/DL (ref 3.4–5)
ALP BLD-CCNC: 72 U/L (ref 40–129)
ALT SERPL-CCNC: 35 U/L (ref 10–40)
ANION GAP SERPL CALCULATED.3IONS-SCNC: 14 MMOL/L (ref 3–16)
AST SERPL-CCNC: 23 U/L (ref 15–37)
BASOPHILS ABSOLUTE: 0.1 K/UL (ref 0–0.2)
BASOPHILS RELATIVE PERCENT: 0.7 %
BILIRUB SERPL-MCNC: 0.7 MG/DL (ref 0–1)
BUN BLDV-MCNC: 9 MG/DL (ref 7–20)
CALCIUM SERPL-MCNC: 9.4 MG/DL (ref 8.3–10.6)
CHLORIDE BLD-SCNC: 102 MMOL/L (ref 99–110)
CHOLESTEROL, TOTAL: 169 MG/DL (ref 0–199)
CO2: 25 MMOL/L (ref 21–32)
CREAT SERPL-MCNC: 0.7 MG/DL (ref 0.9–1.3)
EOSINOPHILS ABSOLUTE: 0.2 K/UL (ref 0–0.6)
EOSINOPHILS RELATIVE PERCENT: 2.4 %
GFR AFRICAN AMERICAN: >60
GFR NON-AFRICAN AMERICAN: >60
GLUCOSE BLD-MCNC: 218 MG/DL (ref 70–99)
HCT VFR BLD CALC: 47.2 % (ref 40.5–52.5)
HDLC SERPL-MCNC: 35 MG/DL (ref 40–60)
HEMOGLOBIN: 16.4 G/DL (ref 13.5–17.5)
LDL CHOLESTEROL CALCULATED: 113 MG/DL
LYMPHOCYTES ABSOLUTE: 1.9 K/UL (ref 1–5.1)
LYMPHOCYTES RELATIVE PERCENT: 24.6 %
MCH RBC QN AUTO: 28.5 PG (ref 26–34)
MCHC RBC AUTO-ENTMCNC: 34.7 G/DL (ref 31–36)
MCV RBC AUTO: 81.9 FL (ref 80–100)
MONOCYTES ABSOLUTE: 0.6 K/UL (ref 0–1.3)
MONOCYTES RELATIVE PERCENT: 7.5 %
NEUTROPHILS ABSOLUTE: 5 K/UL (ref 1.7–7.7)
NEUTROPHILS RELATIVE PERCENT: 64.8 %
PDW BLD-RTO: 14.1 % (ref 12.4–15.4)
PLATELET # BLD: 230 K/UL (ref 135–450)
PMV BLD AUTO: 8.9 FL (ref 5–10.5)
POTASSIUM SERPL-SCNC: 4.3 MMOL/L (ref 3.5–5.1)
PROSTATE SPECIFIC ANTIGEN: 1.29 NG/ML (ref 0–4)
RBC # BLD: 5.76 M/UL (ref 4.2–5.9)
SODIUM BLD-SCNC: 141 MMOL/L (ref 136–145)
TOTAL PROTEIN: 7.5 G/DL (ref 6.4–8.2)
TRIGL SERPL-MCNC: 105 MG/DL (ref 0–150)
TSH REFLEX: 1.03 UIU/ML (ref 0.27–4.2)
VLDLC SERPL CALC-MCNC: 21 MG/DL
WBC # BLD: 7.7 K/UL (ref 4–11)

## 2022-08-11 PROCEDURE — 84443 ASSAY THYROID STIM HORMONE: CPT

## 2022-08-11 PROCEDURE — 84153 ASSAY OF PSA TOTAL: CPT

## 2022-08-11 PROCEDURE — 80053 COMPREHEN METABOLIC PANEL: CPT

## 2022-08-11 PROCEDURE — 80061 LIPID PANEL: CPT

## 2022-08-11 PROCEDURE — 36415 COLL VENOUS BLD VENIPUNCTURE: CPT

## 2022-08-11 PROCEDURE — 85025 COMPLETE CBC W/AUTO DIFF WBC: CPT

## 2022-08-15 DIAGNOSIS — Z79.899 MEDICATION MANAGEMENT: Primary | ICD-10-CM

## 2022-08-15 DIAGNOSIS — E78.5 HYPERLIPIDEMIA, UNSPECIFIED HYPERLIPIDEMIA TYPE: ICD-10-CM

## 2022-08-15 RX ORDER — ATORVASTATIN CALCIUM 40 MG/1
TABLET, FILM COATED ORAL
Qty: 90 TABLET | Refills: 1 | Status: SHIPPED | OUTPATIENT
Start: 2022-08-15

## 2022-08-15 NOTE — TELEPHONE ENCOUNTER
Spole with patient. Notified of SRJ results message. He VU. He is agreeable to medication change, medication pending to verified pharmacy with patient. Patient states he will call back to schedule appt with SHADI.

## 2022-08-15 NOTE — TELEPHONE ENCOUNTER
----- Message from Kaylie Vasquez MD sent at 8/11/2022  4:47 PM EDT -----  Let patient know their lipid test is high, recommend increase Lipitor to 40 mg p.o. nightly, get follow-up lipids/LFTs in 1 to 2 months. Also incidentally let patient know that glucose is high, patient should follow-up with PCP for that. Thanks.

## 2022-08-22 ENCOUNTER — TELEPHONE (OUTPATIENT)
Dept: INTERNAL MEDICINE CLINIC | Age: 57
End: 2022-08-22

## 2022-08-22 DIAGNOSIS — J01.00 ACUTE NON-RECURRENT MAXILLARY SINUSITIS: Primary | ICD-10-CM

## 2022-08-22 DIAGNOSIS — E11.41 TYPE 2 DIABETES MELLITUS WITH DIABETIC MONONEUROPATHY, WITHOUT LONG-TERM CURRENT USE OF INSULIN (HCC): ICD-10-CM

## 2022-08-22 RX ORDER — AZITHROMYCIN 250 MG/1
250 TABLET, FILM COATED ORAL SEE ADMIN INSTRUCTIONS
Qty: 6 TABLET | Refills: 0 | Status: SHIPPED | OUTPATIENT
Start: 2022-08-22 | End: 2022-08-27

## 2022-08-22 NOTE — TELEPHONE ENCOUNTER
Sinus pressure L side of face, gets a sinus infection yearly, denies fever. Or cough.      In the past  a z-jacinta worked well for him

## 2022-08-24 RX ORDER — AZITHROMYCIN 250 MG/1
250 TABLET, FILM COATED ORAL SEE ADMIN INSTRUCTIONS
Qty: 6 TABLET | Refills: 0 | Status: SHIPPED | OUTPATIENT
Start: 2022-08-24 | End: 2022-08-29

## 2022-10-27 DIAGNOSIS — R07.89 OTHER CHEST PAIN: ICD-10-CM

## 2022-10-27 RX ORDER — GLYBURIDE 5 MG/1
TABLET ORAL
Qty: 90 TABLET | Refills: 1 | Status: SHIPPED | OUTPATIENT
Start: 2022-10-27

## 2022-10-27 RX ORDER — CARVEDILOL 6.25 MG/1
TABLET ORAL
Qty: 180 TABLET | Refills: 1 | Status: SHIPPED | OUTPATIENT
Start: 2022-10-27

## 2022-10-27 RX ORDER — NITROGLYCERIN 0.4 MG/1
TABLET SUBLINGUAL
Qty: 25 TABLET | Refills: 3 | Status: SHIPPED | OUTPATIENT
Start: 2022-10-27

## 2022-10-27 RX ORDER — CITALOPRAM 20 MG/1
20 TABLET ORAL DAILY
Qty: 90 TABLET | Refills: 1 | Status: SHIPPED | OUTPATIENT
Start: 2022-10-27

## 2022-10-27 RX ORDER — LISINOPRIL 5 MG/1
TABLET ORAL
Qty: 90 TABLET | Refills: 1 | Status: SHIPPED | OUTPATIENT
Start: 2022-10-27

## 2022-10-27 NOTE — TELEPHONE ENCOUNTER
Requested Prescriptions     Pending Prescriptions Disp Refills    carvedilol (COREG) 6.25 MG tablet 180 tablet 1     Sig: TAKE ONE TABLET BY MOUTH TWICE A DAY WITH MEALS    glyBURIDE (DIABETA) 5 MG tablet 90 tablet 1     Sig: One a day    lisinopril (PRINIVIL;ZESTRIL) 5 MG tablet 90 tablet 1     Sig: TAKE ONE TABLET BY MOUTH DAILY    metFORMIN (GLUCOPHAGE) 1000 MG tablet 180 tablet 1     Sig: TAKE ONE TABLET BY MOUTH TWICE A DAY    citalopram (CELEXA) 20 MG tablet 90 tablet 1     Sig: Take 1 tablet by mouth daily    nitroGLYCERIN (NITROSTAT) 0.4 MG SL tablet 25 tablet 3     Sig: DISSOLVE 1 TAB UNDER TONGUE FOR CHEST PAIN - IF PAIN REMAINS AFTER 5 MIN, CALL 911 AND REPEAT DOSE.  MAX 3 TABS IN 15 MINUTES

## 2023-01-31 DIAGNOSIS — E78.5 HYPERLIPIDEMIA, UNSPECIFIED HYPERLIPIDEMIA TYPE: ICD-10-CM

## 2023-01-31 RX ORDER — ATORVASTATIN CALCIUM 40 MG/1
TABLET, FILM COATED ORAL
Qty: 90 TABLET | Refills: 0 | Status: SHIPPED | OUTPATIENT
Start: 2023-01-31

## 2023-02-08 ENCOUNTER — TELEPHONE (OUTPATIENT)
Dept: CARDIOLOGY CLINIC | Age: 58
End: 2023-02-08

## 2023-02-08 NOTE — TELEPHONE ENCOUNTER
Appears medicaiton was filled by patient PCP Alexi Hernandez MD. Called and spoke with [address] [place] [address] confirmed atorvastatin (Lipitor) prescription is ready for pick-up. Attempted to reach patient. No answer. Left detailed VM notifying prescription is ready at pharmacy.

## 2023-02-08 NOTE — TELEPHONE ENCOUNTER
Pt is requesting a refill of Atorvastatin, pt has been taking 40mg nightly. Preferred pharmacy is [address] in [address] [place] [address]. Last ov 01/09/2020 pedro pablop. Upcoming ov 02/28/2023 vsp. Pt is out of the medication.

## 2023-05-16 NOTE — TELEPHONE ENCOUNTER
Called to make an appointment last seen 06/23/2022    Requested Prescriptions     Pending Prescriptions Disp Refills    glyBURIDE (DIABETA) 5 MG tablet 90 tablet 0     Sig: One a day    lisinopril (PRINIVIL;ZESTRIL) 5 MG tablet 90 tablet 0     Sig: TAKE ONE TABLET BY MOUTH DAILY    metFORMIN (GLUCOPHAGE) 1000 MG tablet 180 tablet 0     Sig: TAKE ONE TABLET BY MOUTH TWICE A DAY    carvedilol (COREG) 6.25 MG tablet 180 tablet 0     Sig: TAKE ONE TABLET BY MOUTH TWICE A DAY WITH MEALS

## 2023-05-18 RX ORDER — GLYBURIDE 5 MG/1
TABLET ORAL
Qty: 30 TABLET | Refills: 0 | Status: SHIPPED | OUTPATIENT
Start: 2023-05-18

## 2023-05-18 RX ORDER — CARVEDILOL 6.25 MG/1
TABLET ORAL
Qty: 60 TABLET | Refills: 0 | Status: SHIPPED | OUTPATIENT
Start: 2023-05-18

## 2023-05-18 RX ORDER — LISINOPRIL 5 MG/1
TABLET ORAL
Qty: 30 TABLET | Refills: 0 | Status: SHIPPED | OUTPATIENT
Start: 2023-05-18

## 2023-06-20 ENCOUNTER — TELEPHONE (OUTPATIENT)
Dept: INTERNAL MEDICINE CLINIC | Age: 58
End: 2023-06-20

## 2023-07-03 NOTE — TELEPHONE ENCOUNTER
[facility] Transitional Care Coordination     Referral from:  Dr. Murphy    Facesheet indicates: Clinton Memorial error

## 2024-01-25 ENCOUNTER — OFFICE VISIT (OUTPATIENT)
Dept: INTERNAL MEDICINE CLINIC | Age: 59
End: 2024-01-25

## 2024-01-25 ENCOUNTER — HOSPITAL ENCOUNTER (OUTPATIENT)
Age: 59
Discharge: HOME | End: 2024-01-25

## 2024-01-25 VITALS
BODY MASS INDEX: 34.46 KG/M2 | HEIGHT: 73 IN | WEIGHT: 260 LBS | SYSTOLIC BLOOD PRESSURE: 130 MMHG | DIASTOLIC BLOOD PRESSURE: 80 MMHG

## 2024-01-25 DIAGNOSIS — J30.1 SEASONAL ALLERGIC RHINITIS DUE TO POLLEN: ICD-10-CM

## 2024-01-25 DIAGNOSIS — E11.41 TYPE 2 DIABETES MELLITUS WITH DIABETIC MONONEUROPATHY, WITHOUT LONG-TERM CURRENT USE OF INSULIN (HCC): Primary | ICD-10-CM

## 2024-01-25 DIAGNOSIS — I10 ESSENTIAL HYPERTENSION: ICD-10-CM

## 2024-01-25 DIAGNOSIS — E78.5 HYPERLIPIDEMIA, UNSPECIFIED HYPERLIPIDEMIA TYPE: ICD-10-CM

## 2024-01-25 DIAGNOSIS — I25.83 CORONARY ARTERY DISEASE DUE TO LIPID RICH PLAQUE: ICD-10-CM

## 2024-01-25 DIAGNOSIS — I25.10 CORONARY ARTERY DISEASE DUE TO LIPID RICH PLAQUE: ICD-10-CM

## 2024-01-25 DIAGNOSIS — E11.41 TYPE 2 DIABETES MELLITUS WITH DIABETIC MONONEUROPATHY, WITHOUT LONG-TERM CURRENT USE OF INSULIN (HCC): ICD-10-CM

## 2024-01-25 LAB
ALBUMIN SERPL-MCNC: 4.7 G/DL (ref 3.4–5)
ALBUMIN/GLOB SERPL: 1.6 {RATIO} (ref 1.1–2.2)
ALP SERPL-CCNC: 86 U/L (ref 40–129)
ALT SERPL-CCNC: 40 U/L (ref 10–40)
ANION GAP SERPL CALCULATED.3IONS-SCNC: 14 MMOL/L (ref 3–16)
AST SERPL-CCNC: 23 U/L (ref 15–37)
BILIRUB SERPL-MCNC: 0.6 MG/DL (ref 0–1)
BUN SERPL-MCNC: 11 MG/DL (ref 7–20)
CALCIUM SERPL-MCNC: 9.4 MG/DL (ref 8.3–10.6)
CHLORIDE SERPL-SCNC: 98 MMOL/L (ref 99–110)
CHOLEST SERPL-MCNC: 141 MG/DL (ref 0–199)
CO2 SERPL-SCNC: 25 MMOL/L (ref 21–32)
CREAT SERPL-MCNC: 0.8 MG/DL (ref 0.9–1.3)
GFR SERPLBLD CREATININE-BSD FMLA CKD-EPI: >60 ML/MIN/{1.73_M2}
GLUCOSE SERPL-MCNC: 267 MG/DL (ref 70–99)
HBA1C MFR BLD: 9 %
HDLC SERPL-MCNC: 36 MG/DL (ref 40–60)
LDLC SERPL CALC-MCNC: 80 MG/DL
POTASSIUM SERPL-SCNC: 4.3 MMOL/L (ref 3.5–5.1)
PROT SERPL-MCNC: 7.6 G/DL (ref 6.4–8.2)
PSA SERPL DL<=0.01 NG/ML-MCNC: 1.74 NG/ML (ref 0–4)
SODIUM SERPL-SCNC: 137 MMOL/L (ref 136–145)
TRIGL SERPL-MCNC: 127 MG/DL (ref 0–150)
VLDLC SERPL CALC-MCNC: 25 MG/DL

## 2024-01-25 PROCEDURE — 83036 HEMOGLOBIN GLYCOSYLATED A1C: CPT | Performed by: INTERNAL MEDICINE

## 2024-01-25 PROCEDURE — 3079F DIAST BP 80-89 MM HG: CPT | Performed by: INTERNAL MEDICINE

## 2024-01-25 PROCEDURE — 84153 ASSAY OF PSA TOTAL: CPT

## 2024-01-25 PROCEDURE — 80053 COMPREHEN METABOLIC PANEL: CPT

## 2024-01-25 PROCEDURE — 80061 LIPID PANEL: CPT

## 2024-01-25 PROCEDURE — 3075F SYST BP GE 130 - 139MM HG: CPT | Performed by: INTERNAL MEDICINE

## 2024-01-25 PROCEDURE — 3052F HG A1C>EQUAL 8.0%<EQUAL 9.0%: CPT | Performed by: INTERNAL MEDICINE

## 2024-01-25 PROCEDURE — 99214 OFFICE O/P EST MOD 30 MIN: CPT | Performed by: INTERNAL MEDICINE

## 2024-01-25 PROCEDURE — 36415 COLL VENOUS BLD VENIPUNCTURE: CPT

## 2024-01-25 RX ORDER — ATORVASTATIN CALCIUM 40 MG/1
TABLET, FILM COATED ORAL
Qty: 90 TABLET | Refills: 1 | Status: SHIPPED | OUTPATIENT
Start: 2024-01-25

## 2024-01-25 RX ORDER — GLYBURIDE 5 MG/1
TABLET ORAL
Qty: 90 TABLET | Refills: 1 | Status: SHIPPED | OUTPATIENT
Start: 2024-01-25

## 2024-01-25 RX ORDER — PIOGLITAZONEHYDROCHLORIDE 30 MG/1
30 TABLET ORAL DAILY
Qty: 30 TABLET | Refills: 3 | Status: SHIPPED | OUTPATIENT
Start: 2024-01-25

## 2024-01-25 RX ORDER — LISINOPRIL 5 MG/1
TABLET ORAL
Qty: 90 TABLET | Refills: 1 | Status: SHIPPED | OUTPATIENT
Start: 2024-01-25

## 2024-01-25 RX ORDER — CARVEDILOL 6.25 MG/1
TABLET ORAL
Qty: 180 TABLET | Refills: 1 | Status: SHIPPED | OUTPATIENT
Start: 2024-01-25

## 2024-01-25 NOTE — PATIENT INSTRUCTIONS
Type 2 Diabetes:  Glucose levels elevated at home and hemoglobin A1c elevated at 9.0% (goal less than 7.0%). Continue taking metformin 1000 mg twice per day AND glyburide 5 mg ONCE per day in the morning.  START taking pioglitazone (Actos) 30 mg tablet every morning.    Primary Hypertension:  Blood pressure good today at 130/80, Continue taking lisinopril 5 mg once per day and carvedilol (Coreg) 6.25 mg twice per day.    History of heart disease:  Continue taking aspirin 81 mg daily.   Dyslipidemia:  Continue taking atorvastatin (Lipitor) 20 mg every evening.   Allergic rhinitis:  This is causing post nasal drainage induced cough and sore throat, especially at night and vertigo.  Use Flonase (fluticasone) nasal spray, two sprays in each nostril every night at bedtime and use nasal saline (Ocean spray) before the Flonase at bedtime and several times during the day especially in the morning to keep nasal secretions from drying out.      Follow-up in four weeks  Screening colonoscopy, refer to Dr. Tenorio

## 2024-01-25 NOTE — PROGRESS NOTES
Continue taking atorvastatin (Lipitor) 20 mg every evening.   Allergic rhinitis:  This is causing post nasal drainage induced cough and sore throat, especially at night and vertigo.  Use Flonase (fluticasone) nasal spray, two sprays in each nostril every night at bedtime and use nasal saline (Ocean spray) before the Flonase at bedtime and several times during the day especially in the morning to keep nasal secretions from drying out.      Follow-up in four weeks  Screening colonoscopy, refer to Dr. Tenorio

## 2024-05-20 NOTE — TELEPHONE ENCOUNTER
Requested Prescriptions     Pending Prescriptions Disp Refills    metFORMIN (GLUCOPHAGE) 1000 MG tablet 90 tablet 5     Sig: TAKE ONE TABLET BY MOUTH TWICE A DAY

## 2024-06-17 NOTE — TELEPHONE ENCOUNTER
Requested Prescriptions     Pending Prescriptions Disp Refills    pioglitazone (ACTOS) 30 MG tablet 30 tablet 3     Sig: Take 1 tablet by mouth daily

## 2024-06-18 RX ORDER — PIOGLITAZONEHYDROCHLORIDE 30 MG/1
30 TABLET ORAL DAILY
Qty: 30 TABLET | Refills: 3 | Status: SHIPPED | OUTPATIENT
Start: 2024-06-18

## 2024-07-15 ENCOUNTER — OFFICE VISIT (OUTPATIENT)
Dept: INTERNAL MEDICINE CLINIC | Age: 59
End: 2024-07-15

## 2024-07-15 VITALS
WEIGHT: 272 LBS | BODY MASS INDEX: 36.05 KG/M2 | HEART RATE: 70 BPM | DIASTOLIC BLOOD PRESSURE: 82 MMHG | OXYGEN SATURATION: 98 % | SYSTOLIC BLOOD PRESSURE: 135 MMHG | HEIGHT: 73 IN

## 2024-07-15 DIAGNOSIS — I25.10 CORONARY ARTERY DISEASE DUE TO LIPID RICH PLAQUE: ICD-10-CM

## 2024-07-15 DIAGNOSIS — I10 ESSENTIAL HYPERTENSION: ICD-10-CM

## 2024-07-15 DIAGNOSIS — M77.12 LATERAL EPICONDYLITIS OF LEFT ELBOW: ICD-10-CM

## 2024-07-15 DIAGNOSIS — E78.5 HYPERLIPIDEMIA, UNSPECIFIED HYPERLIPIDEMIA TYPE: ICD-10-CM

## 2024-07-15 DIAGNOSIS — E11.41 TYPE 2 DIABETES MELLITUS WITH DIABETIC MONONEUROPATHY, WITHOUT LONG-TERM CURRENT USE OF INSULIN (HCC): Primary | ICD-10-CM

## 2024-07-15 DIAGNOSIS — I25.83 CORONARY ARTERY DISEASE DUE TO LIPID RICH PLAQUE: ICD-10-CM

## 2024-07-15 LAB — HBA1C MFR BLD: 7.1 %

## 2024-07-15 PROCEDURE — 3079F DIAST BP 80-89 MM HG: CPT | Performed by: INTERNAL MEDICINE

## 2024-07-15 PROCEDURE — 3051F HG A1C>EQUAL 7.0%<8.0%: CPT | Performed by: INTERNAL MEDICINE

## 2024-07-15 PROCEDURE — 99213 OFFICE O/P EST LOW 20 MIN: CPT | Performed by: INTERNAL MEDICINE

## 2024-07-15 PROCEDURE — 3075F SYST BP GE 130 - 139MM HG: CPT | Performed by: INTERNAL MEDICINE

## 2024-07-15 PROCEDURE — 83036 HEMOGLOBIN GLYCOSYLATED A1C: CPT | Performed by: INTERNAL MEDICINE

## 2024-07-15 RX ORDER — NITROGLYCERIN 0.4 MG/1
TABLET SUBLINGUAL
Qty: 25 TABLET | Refills: 3 | Status: CANCELLED | OUTPATIENT
Start: 2024-07-15

## 2024-07-15 NOTE — PROGRESS NOTES
SUBJECTIVE:   Acute visit for right elbow pain.  Follow up from 1/25/24 for diabetes and hypertension.  Used a chain saw a month ago, now has lateral elbow pain with movement, worse as the day progresses.         MEDICATIONS:  pioglitazone (ACTOS) 30 MG tablet Take 1 tablet by mouth daily   metFORMIN (GLUCOPHAGE) 1000 MG tablet TAKE ONE TABLET BY MOUTH TWICE A DAY   glyBURIDE (DIABETA) 5 MG tablet One a day   lisinopril (PRINIVIL;ZESTRIL) 5 MG tablet TAKE ONE TABLET BY MOUTH DAILY   carvedilol (COREG) 6.25 MG tablet TAKE ONE TABLET BY MOUTH TWICE A DAY WITH MEALS   atorvastatin (LIPITOR) 40 MG tablet Take one tablet by mouth daily   nitroGLYCERIN (NITROSTAT) 0.4 MG SL tablet DISSOLVE 1 TAB UNDER TONGUE FOR CHEST PAIN - IF PAIN REMAINS AFTER 5 MIN, CALL 911 AND REPEAT DOSE. MAX 3 TABS IN 15 MINUTES   sildenafil (VIAGRA) 100 MG tablet Take 1 tablet by mouth as needed for Erectile Dysfunction   aspirin 81 MG chewable tablet Take 1 tablet by mouth daily.       HgbA1c (3/25/21) 9.1%, (6/14/23) 7.4%, (1/25/24) 9.0%, (7/15/24) 7.1%     Lipid panel (1/25/24)   Cholesterol, Total 141   Triglycerides 127   HDL 36 Low    LDL Calculated 80       PSA (1/25) 1.74 ng/mL    OBJECTIVE:    /82 (Site: Left Upper Arm, Position: Sitting)   Pulse 70   Ht 1.854 m (6' 1\")   Wt 123.4 kg (272 lb)   SpO2 98%   BMI 35.89 kg/m²   HEENT:  Oropharynx clear, no lymphadenopathy,   LUNGS:  Clear and without wheezes  HEART:  Regular rhythm, no appreciable murmur  ABD:  Benign, active bowel sounds  EXT:  No edema, pulses palpable  NEURO:  No focal neurologic deficits noted.    ASSESSMENT / PLAN:   Right \"tennis\" elbow lateral epicondylitis. Use a tennis elbow brace about an inch distal to the lateral epicondyle of the elbow during the day especially when working.  You can take naproxen (Aleve) 220 mg TWICE per day.  You can take 440 mg (two tablets).    Type 2 Diabetes:  Hemoglobin A1c has decreased from 9.0% in January to 7.1% today, goal

## 2024-07-15 NOTE — PATIENT INSTRUCTIONS
Right \"tennis\" elbow lateral epicondylitis. Use a tennis elbow brace about an inch distal to the lateral epicondyle of the elbow during the day especially when working.  You can take naproxen (Aleve) 220 mg TWICE per day.  You can take 440 mg (two tablets).    Type 2 Diabetes:  Hemoglobin A1c has decreased from 9.0% in January to 7.1% today, goal less than 7.0%.  Continue taking metformin 1000 mg twice per day and glyburide 5 mg and pioglitazone (Actos) 30 mg tablet ONCE epr day.   Primary Hypertension:  Blood pressure borderline today at 135/82, goal less than 130/80.  Continue taking lisinopril 5 mg once per day and carvedilol (Coreg) 6.25 mg twice per day.    History of heart disease:  Continue taking aspirin 81 mg daily.   Dyslipidemia:  Continue taking atorvastatin (Lipitor) 20 mg every evening.  LDL cholesterol is reasonable at 80 (goal less than 70).   Allergic rhinitis with morning sore throat from drainage and dizziness from eustachian tube blockage.  Use Flonase (fluticasone) nasal spray, two sprays in each nostril every night at bedtime and use nasal saline (Ocean spray) before the Flonase at bedtime and several times during the day especially in the morning to keep nasal secretions from drying out.        Follow-up as needed

## 2024-07-21 RX ORDER — NITROGLYCERIN 0.4 MG/1
TABLET SUBLINGUAL
Qty: 25 TABLET | Refills: 3 | Status: SHIPPED | OUTPATIENT
Start: 2024-07-21

## 2024-07-29 DIAGNOSIS — E78.5 HYPERLIPIDEMIA, UNSPECIFIED HYPERLIPIDEMIA TYPE: ICD-10-CM

## 2024-07-29 NOTE — TELEPHONE ENCOUNTER
Requested Prescriptions     Pending Prescriptions Disp Refills    carvedilol (COREG) 6.25 MG tablet 180 tablet 1     Sig: TAKE ONE TABLET BY MOUTH TWICE A DAY WITH MEALS    atorvastatin (LIPITOR) 40 MG tablet 90 tablet 1     Sig: Take one tablet by mouth daily    lisinopril (PRINIVIL;ZESTRIL) 5 MG tablet 90 tablet 1     Sig: TAKE ONE TABLET BY MOUTH DAILY      Follow up scheduled for 01/06/2025

## 2024-07-30 RX ORDER — LISINOPRIL 5 MG/1
TABLET ORAL
Qty: 90 TABLET | Refills: 1 | Status: SHIPPED | OUTPATIENT
Start: 2024-07-30

## 2024-07-30 RX ORDER — ATORVASTATIN CALCIUM 40 MG/1
TABLET, FILM COATED ORAL
Qty: 90 TABLET | Refills: 1 | Status: SHIPPED | OUTPATIENT
Start: 2024-07-30

## 2024-07-30 RX ORDER — CARVEDILOL 6.25 MG/1
TABLET ORAL
Qty: 180 TABLET | Refills: 1 | Status: SHIPPED | OUTPATIENT
Start: 2024-07-30

## 2024-10-21 RX ORDER — GLYBURIDE 5 MG/1
TABLET ORAL
Qty: 90 TABLET | Refills: 1 | Status: SHIPPED | OUTPATIENT
Start: 2024-10-21

## 2024-10-21 NOTE — TELEPHONE ENCOUNTER
Requested Prescriptions     Pending Prescriptions Disp Refills    glyBURIDE (DIABETA) 5 MG tablet 90 tablet 1     Sig: One a day      Follow up scheduled 01/06/2025

## 2024-10-23 RX ORDER — PIOGLITAZONEHYDROCHLORIDE 30 MG/1
30 TABLET ORAL DAILY
Qty: 30 TABLET | Refills: 3 | Status: SHIPPED | OUTPATIENT
Start: 2024-10-23

## 2024-11-05 NOTE — TELEPHONE ENCOUNTER
Requested Prescriptions     Signed Prescriptions Disp Refills    glyBURIDE (DIABETA) 5 MG tablet 90 tablet 1     Sig: One a day     Authorizing Provider: ELLE VOSS    Follow up scheduled 01/06/2025

## 2025-01-16 ENCOUNTER — TELEPHONE (OUTPATIENT)
Dept: INTERNAL MEDICINE CLINIC | Age: 60
End: 2025-01-16

## 2025-03-17 RX ORDER — LISINOPRIL 5 MG/1
TABLET ORAL
Qty: 30 TABLET | Refills: 0 | Status: SHIPPED | OUTPATIENT
Start: 2025-03-17

## 2025-03-17 RX ORDER — CARVEDILOL 6.25 MG/1
TABLET ORAL
Qty: 60 TABLET | Refills: 0 | Status: SHIPPED | OUTPATIENT
Start: 2025-03-17

## 2025-03-17 NOTE — TELEPHONE ENCOUNTER
Requested Prescriptions     Pending Prescriptions Disp Refills    lisinopril (PRINIVIL;ZESTRIL) 5 MG tablet 30 tablet 0     Sig: TAKE ONE TABLET BY MOUTH DAILY    carvedilol (COREG) 6.25 MG tablet 60 tablet 0     Sig: TAKE ONE TABLET BY MOUTH TWICE A DAY WITH MEALS      Due for a follow up

## 2025-04-22 NOTE — TELEPHONE ENCOUNTER
Requested Prescriptions     Pending Prescriptions Disp Refills    metFORMIN (GLUCOPHAGE) 1000 MG tablet 90 tablet 5     Sig: TAKE ONE TABLET BY MOUTH TWICE A DAY    FOLLOW UP SCHEDULED FOR 04/28/2025

## 2025-04-28 ENCOUNTER — OFFICE VISIT (OUTPATIENT)
Dept: INTERNAL MEDICINE CLINIC | Age: 60
End: 2025-04-28

## 2025-04-28 VITALS
HEIGHT: 73 IN | BODY MASS INDEX: 37.11 KG/M2 | HEART RATE: 77 BPM | DIASTOLIC BLOOD PRESSURE: 84 MMHG | SYSTOLIC BLOOD PRESSURE: 139 MMHG | OXYGEN SATURATION: 96 % | WEIGHT: 280 LBS

## 2025-04-28 DIAGNOSIS — Z11.59 NEED FOR HEPATITIS C SCREENING TEST: ICD-10-CM

## 2025-04-28 DIAGNOSIS — Z11.4 SCREENING FOR HIV WITHOUT PRESENCE OF RISK FACTORS: ICD-10-CM

## 2025-04-28 DIAGNOSIS — E78.5 HYPERLIPIDEMIA, UNSPECIFIED HYPERLIPIDEMIA TYPE: ICD-10-CM

## 2025-04-28 DIAGNOSIS — E11.41 TYPE 2 DIABETES MELLITUS WITH DIABETIC MONONEUROPATHY, WITHOUT LONG-TERM CURRENT USE OF INSULIN (HCC): ICD-10-CM

## 2025-04-28 DIAGNOSIS — E11.41 TYPE 2 DIABETES MELLITUS WITH DIABETIC MONONEUROPATHY, WITHOUT LONG-TERM CURRENT USE OF INSULIN (HCC): Primary | ICD-10-CM

## 2025-04-28 RX ORDER — GLYBURIDE 5 MG/1
TABLET ORAL
Qty: 90 TABLET | Refills: 1 | Status: SHIPPED | OUTPATIENT
Start: 2025-04-28

## 2025-04-28 RX ORDER — ATORVASTATIN CALCIUM 40 MG/1
TABLET, FILM COATED ORAL
Qty: 90 TABLET | Refills: 1 | Status: SHIPPED | OUTPATIENT
Start: 2025-04-28

## 2025-04-28 RX ORDER — NITROGLYCERIN 0.4 MG/1
TABLET SUBLINGUAL
Qty: 25 TABLET | Refills: 3 | Status: SHIPPED | OUTPATIENT
Start: 2025-04-28

## 2025-04-28 RX ORDER — CARVEDILOL 6.25 MG/1
TABLET ORAL
Qty: 180 TABLET | Refills: 1 | Status: SHIPPED | OUTPATIENT
Start: 2025-04-28

## 2025-04-28 RX ORDER — PIOGLITAZONE 30 MG/1
30 TABLET ORAL DAILY
Qty: 90 TABLET | Refills: 1 | Status: SHIPPED | OUTPATIENT
Start: 2025-04-28

## 2025-04-28 RX ORDER — LISINOPRIL 10 MG/1
10 TABLET ORAL DAILY
Qty: 90 TABLET | Refills: 1 | Status: SHIPPED | OUTPATIENT
Start: 2025-04-28

## 2025-04-28 NOTE — PROGRESS NOTES
[facility] clinic note                   Subjective: He is here for office follow-up visit.  States he is doing well, denies chest pain or shortness of breath.  Only complaint he has is that he noticed at times his urine stream occasionally seems to be weak.    Presenting Admission History:       59 year old male with PMH significant for DM type II/on oral agents, hypertension, hyperlipidemia, CAD.  Here for office follow-up visit.  Denies any chest pain, denies shortness of breath.  Overall states he is doing well.  Only concern he has is that he has noted his urine stream occasionally in the mornings is weaker than the has been.      Assessment/Plan:      Current Principal Problem:  [unfilled]      DM type II:  He is maintained on oral agents.  Last HbA1C = 7.1 from 7/2024    He is currently maintained on metformin 1000 mg p.o. twice daily, glyburide 5 mg daily, Actos 30 mg daily.  We did discuss diabetic diet, benefit of exercise.  Will continue current medications, will check A1c today      Hypertension: Blood pressure noted.  Mildly elevated.  Will increase lisinopril from 5 mg daily to 10 mg daily.  Discussed with patient and new prescription generated.    Hyperlipidemia: He will continue on atorvastatin 40 mg daily, again discussed benefit of exercise and dietary modification.    Will check fasting lipid profile     H/O CAD: History as noted, continue on daily aspirin as needed nitroglycerin.  He denies any chest pain and states he is doing well.  He did have a cardiac catheterization done number of years ago, it did reveal CAD but was for medical management daily aspirin, statin and beta-blocker.  He has not follow-up with cardiology number of years.  Discussed with him and will schedule follow-up appointment with [facility] cardiology within the next few weeks.        --------------------------------------------------      Medications:        Scheduled Medications     Aspirin 81 mg daily  Coreg 6.25 mg 1 tab p.o.

## 2025-04-29 ENCOUNTER — CLINICAL DOCUMENTATION (OUTPATIENT)
Facility: HOSPITAL | Age: 60
End: 2025-04-29

## 2025-04-29 LAB
ALBUMIN SERPL-MCNC: 4.6 G/DL (ref 3.4–5)
ALBUMIN/GLOB SERPL: 1.8 {RATIO} (ref 1.1–2.2)
ALP SERPL-CCNC: 84 U/L (ref 40–129)
ALT SERPL-CCNC: 57 U/L (ref 10–40)
ANION GAP SERPL CALCULATED.3IONS-SCNC: 11 MMOL/L (ref 3–16)
AST SERPL-CCNC: 34 U/L (ref 15–37)
BILIRUB SERPL-MCNC: 0.4 MG/DL (ref 0–1)
BUN SERPL-MCNC: 8 MG/DL (ref 7–20)
CALCIUM SERPL-MCNC: 10.1 MG/DL (ref 8.3–10.6)
CHLORIDE SERPL-SCNC: 100 MMOL/L (ref 99–110)
CHOLEST SERPL-MCNC: 221 MG/DL (ref 0–199)
CO2 SERPL-SCNC: 27 MMOL/L (ref 21–32)
CREAT SERPL-MCNC: 0.9 MG/DL (ref 0.9–1.3)
EST. AVERAGE GLUCOSE BLD GHB EST-MCNC: 194.4 MG/DL
GFR SERPLBLD CREATININE-BSD FMLA CKD-EPI: >90 ML/MIN/{1.73_M2}
GLUCOSE SERPL-MCNC: 190 MG/DL (ref 70–99)
HBA1C MFR BLD: 8.4 %
HCV AB SERPL QL IA: NORMAL
HDLC SERPL-MCNC: 34 MG/DL (ref 40–60)
HIV 1+2 AB+HIV1 P24 AG SERPL QL IA: NORMAL
HIV 2 AB SERPL QL IA: NORMAL
HIV1 AB SERPL QL IA: NORMAL
HIV1 P24 AG SERPL QL IA: NORMAL
LDLC SERPL CALC-MCNC: 149 MG/DL
POTASSIUM SERPL-SCNC: 4.6 MMOL/L (ref 3.5–5.1)
PROT SERPL-MCNC: 7.2 G/DL (ref 6.4–8.2)
PSA SERPL DL<=0.01 NG/ML-MCNC: 2.05 NG/ML (ref 0–4)
SODIUM SERPL-SCNC: 138 MMOL/L (ref 136–145)
TRIGL SERPL-MCNC: 189 MG/DL (ref 0–150)
VLDLC SERPL CALC-MCNC: 38 MG/DL

## 2025-04-29 NOTE — PROGRESS NOTES
Reviewed chart, called and spoke with Omega Barber, he stated he does have coverage with [employer], I checked the [place] Medicaid Portal and he does have the following coverage.     [facility] Medicaid MCE     He also asked about going to [facility], I advised they will run his card for his medication and to bring his card with him that next time he had an appointment with a [facility] physician.     Maryellen Wen   Financial Navigator

## 2025-04-30 ENCOUNTER — TELEPHONE (OUTPATIENT)
Dept: INTERNAL MEDICINE CLINIC | Age: 60
End: 2025-04-30

## 2025-04-30 NOTE — TELEPHONE ENCOUNTER
that affects renal tubular secretion.   Calcium 10.1 9.4 9.4 9.6 9.6 9.7 9.9   Total Protein 7.2 7.6 7.5 7.5 7.0  7.7   Albumin 4.6 4.7 4.4 4.5 4.2  4.6   Albumin/Globulin Ratio 1.8 1.6 1.4 1.5 1.5  1.5   Total Bilirubin 0.4 0.6 0.7 0.5 0.4  0.6   Alkaline Phosphatase 84 86 72 53 52  55   ALT 57 High  40 35 33 22  21   AST 34 23 23 22 16  18   GFR Non-   >60 R, CM >60 R, CM >60 R, CM >60 R, CM >60 R, CM   GFR    >60 R, CM >60 R, CM >60 R, CM >60 R, CM >60 R, CM   Globulin    3.0 R 2.8 R  3.1 R   Resulting Agency [facility]             Specimen Collected: 04/28/25 11:15 EDT Last Resulted: 04/29/25 11:38 EDT

## 2025-08-04 ENCOUNTER — OFFICE VISIT (OUTPATIENT)
Dept: CARDIOLOGY CLINIC | Age: 60
End: 2025-08-04
Payer: COMMERCIAL

## 2025-08-04 VITALS
SYSTOLIC BLOOD PRESSURE: 146 MMHG | DIASTOLIC BLOOD PRESSURE: 76 MMHG | WEIGHT: 270.4 LBS | HEIGHT: 73 IN | BODY MASS INDEX: 35.84 KG/M2 | HEART RATE: 73 BPM | OXYGEN SATURATION: 97 %

## 2025-08-04 DIAGNOSIS — I10 PRIMARY HYPERTENSION: ICD-10-CM

## 2025-08-04 DIAGNOSIS — I25.10 CORONARY ARTERY DISEASE INVOLVING NATIVE CORONARY ARTERY OF NATIVE HEART, UNSPECIFIED WHETHER ANGINA PRESENT: Primary | ICD-10-CM

## 2025-08-04 DIAGNOSIS — E78.5 HYPERLIPIDEMIA, UNSPECIFIED HYPERLIPIDEMIA TYPE: ICD-10-CM

## 2025-08-04 DIAGNOSIS — I25.10 CORONARY ARTERY DISEASE, UNSPECIFIED VESSEL OR LESION TYPE, UNSPECIFIED WHETHER ANGINA PRESENT, UNSPECIFIED WHETHER NATIVE OR TRANSPLANTED HEART: ICD-10-CM

## 2025-08-04 DIAGNOSIS — R07.89 ATYPICAL CHEST PAIN: ICD-10-CM

## 2025-08-04 DIAGNOSIS — R07.9 CHEST PAIN, UNSPECIFIED TYPE: ICD-10-CM

## 2025-08-04 PROCEDURE — 3077F SYST BP >= 140 MM HG: CPT | Performed by: INTERNAL MEDICINE

## 2025-08-04 PROCEDURE — 99204 OFFICE O/P NEW MOD 45 MIN: CPT | Performed by: INTERNAL MEDICINE

## 2025-08-04 PROCEDURE — G2211 COMPLEX E/M VISIT ADD ON: HCPCS | Performed by: INTERNAL MEDICINE

## 2025-08-04 PROCEDURE — 3078F DIAST BP <80 MM HG: CPT | Performed by: INTERNAL MEDICINE

## 2025-08-04 PROCEDURE — 93000 ELECTROCARDIOGRAM COMPLETE: CPT | Performed by: INTERNAL MEDICINE

## 2025-08-04 RX ORDER — ASPIRIN 325 MG
325 TABLET ORAL DAILY
COMMUNITY